# Patient Record
Sex: MALE | Race: OTHER | Employment: FULL TIME | URBAN - METROPOLITAN AREA
[De-identification: names, ages, dates, MRNs, and addresses within clinical notes are randomized per-mention and may not be internally consistent; named-entity substitution may affect disease eponyms.]

---

## 2018-03-28 ENCOUNTER — TELEPHONE (OUTPATIENT)
Dept: FAMILY MEDICINE CLINIC | Age: 48
End: 2018-03-28

## 2018-03-28 DIAGNOSIS — E11.9 TYPE 2 DIABETES MELLITUS WITHOUT COMPLICATION, WITHOUT LONG-TERM CURRENT USE OF INSULIN (HCC): ICD-10-CM

## 2018-03-30 ENCOUNTER — OFFICE VISIT (OUTPATIENT)
Dept: FAMILY MEDICINE CLINIC | Age: 48
End: 2018-03-30

## 2018-03-30 VITALS
OXYGEN SATURATION: 98 % | HEART RATE: 71 BPM | BODY MASS INDEX: 22.5 KG/M2 | TEMPERATURE: 97.1 F | DIASTOLIC BLOOD PRESSURE: 64 MMHG | RESPIRATION RATE: 16 BRPM | HEIGHT: 66 IN | SYSTOLIC BLOOD PRESSURE: 110 MMHG | WEIGHT: 140 LBS

## 2018-03-30 DIAGNOSIS — B35.3 TINEA PEDIS OF BOTH FEET: ICD-10-CM

## 2018-03-30 DIAGNOSIS — E11.9 TYPE 2 DIABETES MELLITUS WITHOUT COMPLICATION, WITHOUT LONG-TERM CURRENT USE OF INSULIN (HCC): Primary | ICD-10-CM

## 2018-03-30 DIAGNOSIS — E78.5 HYPERLIPIDEMIA, UNSPECIFIED HYPERLIPIDEMIA TYPE: ICD-10-CM

## 2018-03-30 LAB
A/G RATIO: 1.5 (ref 1.1–2.2)
ALBUMIN SERPL-MCNC: 4.2 G/DL (ref 3.4–5)
ALP BLD-CCNC: 112 U/L (ref 40–129)
ALT SERPL-CCNC: 17 U/L (ref 10–40)
ANION GAP SERPL CALCULATED.3IONS-SCNC: 14 MMOL/L (ref 3–16)
AST SERPL-CCNC: 11 U/L (ref 15–37)
BILIRUB SERPL-MCNC: 0.7 MG/DL (ref 0–1)
BUN BLDV-MCNC: 14 MG/DL (ref 7–20)
CALCIUM SERPL-MCNC: 8.9 MG/DL (ref 8.3–10.6)
CHLORIDE BLD-SCNC: 100 MMOL/L (ref 99–110)
CHOLESTEROL, TOTAL: 216 MG/DL (ref 0–199)
CO2: 26 MMOL/L (ref 21–32)
CREAT SERPL-MCNC: 0.6 MG/DL (ref 0.9–1.3)
CREATININE URINE: 146.4 MG/DL (ref 39–259)
GFR AFRICAN AMERICAN: >60
GFR NON-AFRICAN AMERICAN: >60
GLOBULIN: 2.8 G/DL
GLUCOSE BLD-MCNC: 212 MG/DL (ref 70–99)
HDLC SERPL-MCNC: 44 MG/DL (ref 40–60)
LDL CHOLESTEROL CALCULATED: 123 MG/DL
MICROALBUMIN UR-MCNC: 56.5 MG/DL
MICROALBUMIN/CREAT UR-RTO: 385.9 MG/G (ref 0–30)
POTASSIUM SERPL-SCNC: 4.3 MMOL/L (ref 3.5–5.1)
SODIUM BLD-SCNC: 140 MMOL/L (ref 136–145)
TOTAL PROTEIN: 7 G/DL (ref 6.4–8.2)
TRIGL SERPL-MCNC: 247 MG/DL (ref 0–150)
VLDLC SERPL CALC-MCNC: 49 MG/DL

## 2018-03-30 PROCEDURE — 99214 OFFICE O/P EST MOD 30 MIN: CPT | Performed by: FAMILY MEDICINE

## 2018-03-30 RX ORDER — BLOOD-GLUCOSE METER
KIT MISCELLANEOUS
Qty: 1 KIT | Refills: 0 | Status: SHIPPED | OUTPATIENT
Start: 2018-03-30

## 2018-03-30 RX ORDER — LANCETS 30 GAUGE
EACH MISCELLANEOUS
Qty: 100 EACH | Refills: 3 | Status: SHIPPED | OUTPATIENT
Start: 2018-03-30 | End: 2018-05-04 | Stop reason: SDUPTHER

## 2018-03-30 ASSESSMENT — ENCOUNTER SYMPTOMS
BLURRED VISION: 0
VISUAL CHANGE: 0

## 2018-03-30 NOTE — PROGRESS NOTES
Subjective:      Patient ID: Zonia Vogel is a 49 y.o. male. Diabetes   He presents for his follow-up diabetic visit. He has type 2 diabetes mellitus. His disease course has been stable. There are no hypoglycemic associated symptoms. Pertinent negatives for hypoglycemia include no confusion, dizziness, headaches, hunger, mood changes, nervousness/anxiousness, pallor, seizures, sleepiness, speech difficulty, sweats or tremors. Associated symptoms include weight loss. Pertinent negatives for diabetes include no blurred vision, no chest pain, no fatigue, no foot paresthesias, no foot ulcerations, no polydipsia, no polyphagia, no polyuria, no visual change and no weakness. There are no hypoglycemic complications. Pertinent negatives for hypoglycemia complications include no blackouts, no hospitalization, no nocturnal hypoglycemia, no required assistance and no required glucagon injection. Symptoms are stable. Pertinent negatives for diabetic complications include no CVA. Risk factors for coronary artery disease include diabetes mellitus and hypertension. Review of Systems   Constitutional: Positive for weight loss. Negative for fatigue. Eyes: Negative for blurred vision. Cardiovascular: Negative for chest pain. Endocrine: Negative for polydipsia, polyphagia and polyuria. Skin: Negative for pallor. Neurological: Negative for dizziness, tremors, seizures, speech difficulty, weakness and headaches. Psychiatric/Behavioral: Negative for confusion. The patient is not nervous/anxious. has No Known Allergies.       Current Outpatient Prescriptions:     glipiZIDE (GLUCOTROL) 10 MG tablet, Take 1 tablet by mouth 2 times daily, Disp: 60 tablet, Rfl: 1    metFORMIN (GLUCOPHAGE) 1000 MG tablet, Take 1 tablet by mouth daily (with breakfast), Disp: 90 tablet, Rfl: 3    metFORMIN (GLUCOPHAGE) 1000 MG tablet, Take 1 tablet by mouth 2 times daily (with meals), Disp: 60 tablet, Rfl: 2    omeprazole (PRILOSEC) 40 MG capsule, Take one po qam, Disp: 30 capsule, Rfl: 1    aspirin 325 MG EC tablet, Take 1 tablet by mouth daily. , Disp: 30 tablet, Rfl: 3    glucose blood VI test strips (ZOHRA CONTOUR TEST) strip, 1 each by In Vitro route daily. Checks BS bid Dx 250.00, Disp: 100 each, Rfl: 3    Lancets MISC, Contour Checks BID, Disp: 100 each, Rfl: 3    fluocinolone (SYNALAR) 0.025 % ointment, Apply  topically 2 times daily. Apply topically 2 times daily. , Disp: 15 g, Rfl: 1    terbinafine (LAMISIL AT) 1 % cream, Apply topically 2 times daily on toes, Disp: 15 g, Rfl: 2    HYDROcodone-acetaminophen (NORCO) 5-325 MG per tablet, Take 1 tablet by mouth every 4 hours as needed for Pain for 20 doses. Sedation precautions, Disp: 20 tablet, Rfl: 0    ibuprofen (IBU) 600 MG tablet, Take 1 tablet by mouth every 6 hours as needed for Pain for 20 doses. Take with food, Disp: 20 tablet, Rfl: 0     has a past medical history of Atypical chest pain; Diabetes mellitus ([address] GERD (gastroesophageal reflux disease); and Pleurisy. Past Surgical History:   Procedure Laterality Date    HAND SURGERY      both, 2010         reports that he quit smoking about 13 years ago. He has never used smokeless tobacco.    family history is not on file. Objective:   Physical Exam   Constitutional: He is oriented to person, place, and time. He appears well-developed and well-nourished. No distress. HENT:   Head: Normocephalic. Right Ear: External ear normal.   Left Ear: External ear normal.   Mouth/Throat: Oropharynx is clear and moist. No oropharyngeal exudate. Eyes: Conjunctivae and EOM are normal. Pupils are equal, round, and reactive to light. No scleral icterus. Neck: Normal range of motion. Neck supple. No JVD present. No thyromegaly present. Cardiovascular: Normal rate, regular rhythm, normal heart sounds and intact distal pulses. Exam reveals no gallop and no friction rub. No murmur heard.   Pulmonary/Chest: Effort normal and breath sounds normal. No respiratory distress. He has no wheezes. He has no rales. He exhibits no tenderness. Abdominal: Soft. Bowel sounds are normal. He exhibits no distension. There is no tenderness. There is no rebound. Musculoskeletal: Normal range of motion. He exhibits no edema. Neurological: He is alert and oriented to person, place, and time. He has normal reflexes. No cranial nerve deficit. He exhibits normal muscle tone. Coordination normal.   Skin: Skin is warm. He is not diaphoretic. No erythema. No pallor. Feet: skin dry, moist,erythematous fissure between toes  Filament test : intact  Ankle reflexes: wnl      Psychiatric: He has a normal mood and affect. His behavior is normal. Judgment and thought content normal.   Nursing note and vitals reviewed. Assessment:      1. Type 2 diabetes mellitus without complication, without long-term current use of insulin (HCC)  metFORMIN (GLUCOPHAGE) 1000 MG tablet    Lancets MISC    glucose blood VI test strips (ZOHRA CONTOUR TEST) strip    Comprehensive Metabolic Panel    Hemoglobin A1C    Lipid Panel    Microalbumin / Creatinine Urine Ratio    glucose monitoring kit (FREESTYLE) monitoring kit   2. Hyperlipidemia, unspecified hyperlipidemia type     3. Tinea pedis of both feet             Plan:      1. Poor follow-up I have not seen this patient since 2016. Check blood work  Refill medications that might need to adjust medications  patient agrees and verbalizes understanding    2. Check lipid panel     Feet care, Lamisil    Follow-up in one month    Kenna Stevenson received counseling on the following healthy behaviors: nutrition, exercise and medication adherence    Patient given educational materials on Diabetes, Nutrition and Exercise    I have instructed Kenna Stevenson to complete a self tracking handout on Blood Sugars  and instructed them to bring it with them to his next appointment.      Discussed use, benefit, and side effects of prescribed

## 2018-03-31 LAB
ESTIMATED AVERAGE GLUCOSE: 254.7 MG/DL
HBA1C MFR BLD: 10.5 %

## 2018-04-06 ENCOUNTER — OFFICE VISIT (OUTPATIENT)
Dept: FAMILY MEDICINE CLINIC | Age: 48
End: 2018-04-06

## 2018-04-06 VITALS
RESPIRATION RATE: 16 BRPM | HEIGHT: 66 IN | WEIGHT: 144.6 LBS | OXYGEN SATURATION: 98 % | DIASTOLIC BLOOD PRESSURE: 64 MMHG | HEART RATE: 81 BPM | BODY MASS INDEX: 23.24 KG/M2 | SYSTOLIC BLOOD PRESSURE: 120 MMHG | TEMPERATURE: 97 F

## 2018-04-06 DIAGNOSIS — E78.5 HYPERLIPIDEMIA, UNSPECIFIED HYPERLIPIDEMIA TYPE: ICD-10-CM

## 2018-04-06 PROCEDURE — 99214 OFFICE O/P EST MOD 30 MIN: CPT | Performed by: FAMILY MEDICINE

## 2018-04-06 RX ORDER — ATORVASTATIN CALCIUM 40 MG/1
40 TABLET, FILM COATED ORAL DAILY
Qty: 30 TABLET | Refills: 3 | Status: SHIPPED | OUTPATIENT
Start: 2018-04-06 | End: 2018-09-12 | Stop reason: SDUPTHER

## 2018-04-06 ASSESSMENT — PATIENT HEALTH QUESTIONNAIRE - PHQ9
1. LITTLE INTEREST OR PLEASURE IN DOING THINGS: 0
SUM OF ALL RESPONSES TO PHQ QUESTIONS 1-9: 0
2. FEELING DOWN, DEPRESSED OR HOPELESS: 0
SUM OF ALL RESPONSES TO PHQ9 QUESTIONS 1 & 2: 0

## 2018-04-06 ASSESSMENT — ENCOUNTER SYMPTOMS
VISUAL CHANGE: 0
BLURRED VISION: 0

## 2018-05-04 ENCOUNTER — OFFICE VISIT (OUTPATIENT)
Dept: FAMILY MEDICINE CLINIC | Age: 48
End: 2018-05-04

## 2018-05-04 VITALS
HEIGHT: 67 IN | RESPIRATION RATE: 16 BRPM | OXYGEN SATURATION: 98 % | BODY MASS INDEX: 22.26 KG/M2 | HEART RATE: 87 BPM | SYSTOLIC BLOOD PRESSURE: 114 MMHG | DIASTOLIC BLOOD PRESSURE: 64 MMHG | WEIGHT: 141.8 LBS | TEMPERATURE: 97.3 F

## 2018-05-04 DIAGNOSIS — B35.3 TINEA PEDIS OF RIGHT FOOT: ICD-10-CM

## 2018-05-04 DIAGNOSIS — M54.50 LEFT-SIDED LOW BACK PAIN WITHOUT SCIATICA, UNSPECIFIED CHRONICITY: ICD-10-CM

## 2018-05-04 DIAGNOSIS — E11.9 TYPE 2 DIABETES MELLITUS WITHOUT COMPLICATION, WITHOUT LONG-TERM CURRENT USE OF INSULIN (HCC): ICD-10-CM

## 2018-05-04 DIAGNOSIS — R07.9 EXERTIONAL CHEST PAIN: ICD-10-CM

## 2018-05-04 PROCEDURE — 1036F TOBACCO NON-USER: CPT | Performed by: FAMILY MEDICINE

## 2018-05-04 PROCEDURE — 99214 OFFICE O/P EST MOD 30 MIN: CPT | Performed by: FAMILY MEDICINE

## 2018-05-04 PROCEDURE — 2022F DILAT RTA XM EVC RTNOPTHY: CPT | Performed by: FAMILY MEDICINE

## 2018-05-04 PROCEDURE — G8420 CALC BMI NORM PARAMETERS: HCPCS | Performed by: FAMILY MEDICINE

## 2018-05-04 PROCEDURE — G8427 DOCREV CUR MEDS BY ELIG CLIN: HCPCS | Performed by: FAMILY MEDICINE

## 2018-05-04 PROCEDURE — 3046F HEMOGLOBIN A1C LEVEL >9.0%: CPT | Performed by: FAMILY MEDICINE

## 2018-05-04 RX ORDER — IBUPROFEN 600 MG/1
600 TABLET ORAL EVERY 6 HOURS PRN
Qty: 20 TABLET | Refills: 0 | Status: SHIPPED | OUTPATIENT
Start: 2018-05-04

## 2018-05-04 RX ORDER — HYDROCODONE BITARTRATE AND ACETAMINOPHEN 5; 325 MG/1; MG/1
1 TABLET ORAL EVERY 4 HOURS PRN
Qty: 20 TABLET | Refills: 0 | Status: CANCELLED | OUTPATIENT
Start: 2018-05-04

## 2018-05-04 RX ORDER — LANCETS 30 GAUGE
EACH MISCELLANEOUS
Qty: 100 EACH | Refills: 3 | Status: SHIPPED | OUTPATIENT
Start: 2018-05-04

## 2018-05-04 ASSESSMENT — ENCOUNTER SYMPTOMS
BACK PAIN: 1
SHORTNESS OF BREATH: 1
BLURRED VISION: 0
VISUAL CHANGE: 0
CHEST TIGHTNESS: 1

## 2018-05-05 LAB
ANION GAP SERPL CALCULATED.3IONS-SCNC: 12 MMOL/L (ref 3–16)
BUN BLDV-MCNC: 18 MG/DL (ref 7–20)
CALCIUM SERPL-MCNC: 9.4 MG/DL (ref 8.3–10.6)
CHLORIDE BLD-SCNC: 98 MMOL/L (ref 99–110)
CO2: 28 MMOL/L (ref 21–32)
CREAT SERPL-MCNC: 0.6 MG/DL (ref 0.9–1.3)
ESTIMATED AVERAGE GLUCOSE: 254.7 MG/DL
GFR AFRICAN AMERICAN: >60
GFR NON-AFRICAN AMERICAN: >60
GLUCOSE BLD-MCNC: 143 MG/DL (ref 70–99)
HBA1C MFR BLD: 10.5 %
POTASSIUM SERPL-SCNC: 4.3 MMOL/L (ref 3.5–5.1)
SODIUM BLD-SCNC: 138 MMOL/L (ref 136–145)

## 2018-05-18 ENCOUNTER — TELEPHONE (OUTPATIENT)
Dept: FAMILY MEDICINE CLINIC | Age: 48
End: 2018-05-18

## 2018-05-18 RX ORDER — SYRINGE-NEEDLE,INSULIN,0.5 ML 27GX1/2"
SYRINGE, EMPTY DISPOSABLE MISCELLANEOUS
Qty: 90 EACH | Refills: 1 | Status: SHIPPED | OUTPATIENT
Start: 2018-05-18 | End: 2018-05-18 | Stop reason: ALTCHOICE

## 2018-08-03 ENCOUNTER — OFFICE VISIT (OUTPATIENT)
Dept: FAMILY MEDICINE CLINIC | Age: 48
End: 2018-08-03

## 2018-08-03 VITALS
RESPIRATION RATE: 16 BRPM | BODY MASS INDEX: 22.13 KG/M2 | HEIGHT: 67 IN | HEART RATE: 74 BPM | DIASTOLIC BLOOD PRESSURE: 64 MMHG | WEIGHT: 141 LBS | SYSTOLIC BLOOD PRESSURE: 104 MMHG | TEMPERATURE: 97 F | OXYGEN SATURATION: 98 %

## 2018-08-03 DIAGNOSIS — S16.1XXA STRAIN OF NECK MUSCLE, INITIAL ENCOUNTER: Primary | ICD-10-CM

## 2018-08-03 DIAGNOSIS — R50.9 FEVER, UNSPECIFIED FEVER CAUSE: ICD-10-CM

## 2018-08-03 DIAGNOSIS — E11.9 TYPE 2 DIABETES MELLITUS WITHOUT COMPLICATION, WITHOUT LONG-TERM CURRENT USE OF INSULIN (HCC): ICD-10-CM

## 2018-08-03 DIAGNOSIS — S06.0X0A CONCUSSION WITHOUT LOSS OF CONSCIOUSNESS, INITIAL ENCOUNTER: ICD-10-CM

## 2018-08-03 PROCEDURE — 1036F TOBACCO NON-USER: CPT | Performed by: FAMILY MEDICINE

## 2018-08-03 PROCEDURE — G8420 CALC BMI NORM PARAMETERS: HCPCS | Performed by: FAMILY MEDICINE

## 2018-08-03 PROCEDURE — G8427 DOCREV CUR MEDS BY ELIG CLIN: HCPCS | Performed by: FAMILY MEDICINE

## 2018-08-03 PROCEDURE — 99214 OFFICE O/P EST MOD 30 MIN: CPT | Performed by: FAMILY MEDICINE

## 2018-08-03 PROCEDURE — 2022F DILAT RTA XM EVC RTNOPTHY: CPT | Performed by: FAMILY MEDICINE

## 2018-08-03 PROCEDURE — 3046F HEMOGLOBIN A1C LEVEL >9.0%: CPT | Performed by: FAMILY MEDICINE

## 2018-08-03 RX ORDER — CYCLOBENZAPRINE HCL 5 MG
5 TABLET ORAL 2 TIMES DAILY PRN
Qty: 20 TABLET | Refills: 0 | Status: SHIPPED | OUTPATIENT
Start: 2018-08-03 | End: 2018-08-23

## 2018-08-03 ASSESSMENT — ENCOUNTER SYMPTOMS
ABDOMINAL PAIN: 0
VOMITING: 0
SORE THROAT: 1
VISUAL CHANGE: 0
NAUSEA: 0
BLURRED VISION: 0

## 2018-08-03 NOTE — PROGRESS NOTES
Disp: 1 Tube, Rfl: 1    glipiZIDE (GLUCOTROL) 10 MG tablet, Take 1 tablet by mouth 2 times daily, Disp: 60 tablet, Rfl: 1    metFORMIN (GLUCOPHAGE) 1000 MG tablet, Take 1 tablet by mouth daily (with breakfast), Disp: 90 tablet, Rfl: 3    omeprazole (PRILOSEC) 40 MG capsule, Take one po qam, Disp: 30 capsule, Rfl: 1    fluocinolone (SYNALAR) 0.025 % ointment, Apply  topically 2 times daily. Apply topically 2 times daily. , Disp: 15 g, Rfl: 1    terbinafine (LAMISIL AT) 1 % cream, Apply topically 2 times daily on toes, Disp: 15 g, Rfl: 2    HYDROcodone-acetaminophen (NORCO) 5-325 MG per tablet, Take 1 tablet by mouth every 4 hours as needed for Pain for 20 doses. Sedation precautions, Disp: 20 tablet, Rfl: 0    aspirin 325 MG EC tablet, Take 1 tablet by mouth daily. , Disp: 30 tablet, Rfl: 3     has a past medical history of Atypical chest pain; Diabetes mellitus ([address] GERD (gastroesophageal reflux disease); and Pleurisy. Past Surgical History:   Procedure Laterality Date    HAND SURGERY      both, 2010         reports that he quit smoking about 13 years ago. He has never used smokeless tobacco.    family history is not sig     Objective:   Physical Exam   Constitutional: He is oriented to person, place, and time. He appears well-developed and well-nourished. No distress. HENT:   Head: Normocephalic. Mouth/Throat: Oropharynx is clear and moist. No oropharyngeal exudate. Mild OP erythema  Bilateral tm's wnl     Eyes: Conjunctivae and EOM are normal. Pupils are equal, round, and reactive to light. No scleral icterus. Neck: Normal range of motion. Neck supple. No thyromegaly present. No carotid bruits     Cardiovascular: Normal rate, regular rhythm, S2 normal, normal heart sounds and intact distal pulses. No extrasystoles are present. Exam reveals no friction rub. No murmur heard. No edema     Pulmonary/Chest: Effort normal and breath sounds normal. No respiratory distress. He has no wheezes.  He this medication as it  can cause drowsiness,  patient agrees and verbalizes understanding   Continue ibu  He needs to 's comp   patient agrees and verbalizes understanding    Patient to call if symptoms persist or worsen. 2. Rest, increase fluids  ibu prn   Fu 1 mo    3. NOS  Possible viral illness  Sx tx    4.  Pt did not filled insulin and just takes metformin  Check a1c   Encourage compliance with medication, life style changes

## 2018-08-04 LAB
ESTIMATED AVERAGE GLUCOSE: 248.9 MG/DL
HBA1C MFR BLD: 10.3 %

## 2018-08-10 ENCOUNTER — OFFICE VISIT (OUTPATIENT)
Dept: FAMILY MEDICINE CLINIC | Age: 48
End: 2018-08-10

## 2018-08-10 VITALS
OXYGEN SATURATION: 98 % | HEIGHT: 67 IN | RESPIRATION RATE: 16 BRPM | SYSTOLIC BLOOD PRESSURE: 118 MMHG | WEIGHT: 141 LBS | DIASTOLIC BLOOD PRESSURE: 64 MMHG | BODY MASS INDEX: 22.13 KG/M2 | HEART RATE: 71 BPM | TEMPERATURE: 97.4 F

## 2018-08-10 DIAGNOSIS — K05.10 GINGIVITIS: ICD-10-CM

## 2018-08-10 PROCEDURE — G8420 CALC BMI NORM PARAMETERS: HCPCS | Performed by: FAMILY MEDICINE

## 2018-08-10 PROCEDURE — 2022F DILAT RTA XM EVC RTNOPTHY: CPT | Performed by: FAMILY MEDICINE

## 2018-08-10 PROCEDURE — 1036F TOBACCO NON-USER: CPT | Performed by: FAMILY MEDICINE

## 2018-08-10 PROCEDURE — G8427 DOCREV CUR MEDS BY ELIG CLIN: HCPCS | Performed by: FAMILY MEDICINE

## 2018-08-10 PROCEDURE — 99214 OFFICE O/P EST MOD 30 MIN: CPT | Performed by: FAMILY MEDICINE

## 2018-08-10 PROCEDURE — 3046F HEMOGLOBIN A1C LEVEL >9.0%: CPT | Performed by: FAMILY MEDICINE

## 2018-08-10 RX ORDER — AMOXICILLIN 875 MG/1
875 TABLET, COATED ORAL 2 TIMES DAILY
Qty: 20 TABLET | Refills: 0 | Status: SHIPPED | OUTPATIENT
Start: 2018-08-10 | End: 2018-08-20

## 2018-08-13 ENCOUNTER — TELEPHONE (OUTPATIENT)
Dept: FAMILY MEDICINE CLINIC | Age: 48
End: 2018-08-13

## 2018-09-12 ENCOUNTER — OFFICE VISIT (OUTPATIENT)
Dept: FAMILY MEDICINE CLINIC | Age: 48
End: 2018-09-12

## 2018-09-12 VITALS
BODY MASS INDEX: 24.8 KG/M2 | SYSTOLIC BLOOD PRESSURE: 116 MMHG | TEMPERATURE: 97.6 F | HEIGHT: 63 IN | RESPIRATION RATE: 16 BRPM | WEIGHT: 140 LBS | OXYGEN SATURATION: 98 % | HEART RATE: 83 BPM | DIASTOLIC BLOOD PRESSURE: 70 MMHG

## 2018-09-12 DIAGNOSIS — E11.9 TYPE 2 DIABETES MELLITUS WITHOUT COMPLICATION, WITH LONG-TERM CURRENT USE OF INSULIN (HCC): Primary | ICD-10-CM

## 2018-09-12 DIAGNOSIS — E78.5 HYPERLIPIDEMIA, UNSPECIFIED HYPERLIPIDEMIA TYPE: ICD-10-CM

## 2018-09-12 DIAGNOSIS — Z23 NEED FOR VACCINATION: ICD-10-CM

## 2018-09-12 DIAGNOSIS — Z79.4 TYPE 2 DIABETES MELLITUS WITHOUT COMPLICATION, WITH LONG-TERM CURRENT USE OF INSULIN (HCC): Primary | ICD-10-CM

## 2018-09-12 DIAGNOSIS — M25.551 HIP PAIN, RIGHT: ICD-10-CM

## 2018-09-12 PROCEDURE — 99214 OFFICE O/P EST MOD 30 MIN: CPT | Performed by: FAMILY MEDICINE

## 2018-09-12 PROCEDURE — 90686 IIV4 VACC NO PRSV 0.5 ML IM: CPT | Performed by: FAMILY MEDICINE

## 2018-09-12 PROCEDURE — 90471 IMMUNIZATION ADMIN: CPT | Performed by: FAMILY MEDICINE

## 2018-09-12 RX ORDER — ATORVASTATIN CALCIUM 40 MG/1
40 TABLET, FILM COATED ORAL DAILY
Qty: 30 TABLET | Refills: 3 | Status: SHIPPED | OUTPATIENT
Start: 2018-09-12 | End: 2019-05-02 | Stop reason: SDUPTHER

## 2018-09-12 ASSESSMENT — ENCOUNTER SYMPTOMS
BLURRED VISION: 0
VISUAL CHANGE: 0

## 2018-09-12 NOTE — PROGRESS NOTES
nervous/anxious. has No Known Allergies. Current Outpatient Prescriptions:     insulin NPH (NOVOLIN N) 100 UNIT/ML injection vial, 6 units SQ bid, Disp: 1 vial, Rfl: 3    Insulin Pen Needle 29G X 12MM MISC, 1 each by Does not apply route daily, Disp: 100 each, Rfl: 3    metFORMIN (GLUCOPHAGE) 1000 MG tablet, Take 1 tablet by mouth daily (with breakfast), Disp: 90 tablet, Rfl: 3    Lancets MISC, Contour Checks BID, Disp: 100 each, Rfl: 3    Insulin Pen Needle 29G X 12MM MISC, 1 each by Does not apply route daily, Disp: 100 each, Rfl: 3    ibuprofen (IBU) 600 MG tablet, Take 1 tablet by mouth every 6 hours as needed for Pain Take with food, Disp: 20 tablet, Rfl: 0    atorvastatin (LIPITOR) 40 MG tablet, Take 1 tablet by mouth daily, Disp: 30 tablet, Rfl: 3    metFORMIN (GLUCOPHAGE) 1000 MG tablet, Take 1 tablet by mouth 2 times daily (with meals), Disp: 180 tablet, Rfl: 1    glucose blood VI test strips (ZOHRA CONTOUR TEST) strip, Checks before breakfast and 2 hours after dinner, Disp: 100 each, Rfl: 3    glucose monitoring kit (FREESTYLE) monitoring kit, Check sugar bid, Disp: 1 kit, Rfl: 0    terbinafine (ATHLETES FOOT) 1 % cream, Apply topically 2 times daily. , Disp: 1 Tube, Rfl: 1    glipiZIDE (GLUCOTROL) 10 MG tablet, Take 1 tablet by mouth 2 times daily, Disp: 60 tablet, Rfl: 1    omeprazole (PRILOSEC) 40 MG capsule, Take one po qam, Disp: 30 capsule, Rfl: 1    fluocinolone (SYNALAR) 0.025 % ointment, Apply  topically 2 times daily. Apply topically 2 times daily. , Disp: 15 g, Rfl: 1    terbinafine (LAMISIL AT) 1 % cream, Apply topically 2 times daily on toes, Disp: 15 g, Rfl: 2    HYDROcodone-acetaminophen (NORCO) 5-325 MG per tablet, Take 1 tablet by mouth every 4 hours as needed for Pain for 20 doses. Sedation precautions, Disp: 20 tablet, Rfl: 0    aspirin 325 MG EC tablet, Take 1 tablet by mouth daily. , Disp: 30 tablet, Rfl: 3     has a past medical history of Atypical chest 2. Hyperlipidemia, unspecified hyperlipidemia type  atorvastatin (LIPITOR) 40 MG tablet   3. Hip pain, right         Plan:      1. Seems to be improving  Continue same medications no changes needed at this time   Check labs in 2 months    2. Poor adherence  Sent new rx to his pharmacy  After discussion of the treatment of hyperlipidemia, a statin-drug is chosen; prescription for Lipitor (atorvastatin) once daily is written. The patient is informed that this type of drug is usually highly effective to lower LDL cholesterol and is usually very well tolerated. However, statin-type drugs can potentially injure the liver. Blood tests will be required at regular intervals for the duration of therapy. Damage to the liver, if detected early, can be reversed by stopping the drug. Be alert for persistent nausea, abdominal pain, or yellow jaundice, and report such to me directly. Statin drugs may also cause skeletal muscle injury in rare cases. Be alert for pronounced persistent diffuse muscle pain and discontinue the drug immediately should such symptoms develop. Grapefruit juice may increase the blood levels and side effects of some HMG Co-A reductase inhibitors (Zocor, Lipitor and Mevacor but not Pravachol or Lescol). Patient is counseled in this regard. flu vaccine today    3.  Hip pain  After a work accident, seems to be a chronic strain vs bursitis  nsaid prn   Continue PT and discuss with worker's comp MD  patient agrees and verbalizes understanding         Lanie Baron MD

## 2018-09-13 LAB
ANION GAP SERPL CALCULATED.3IONS-SCNC: 12 MMOL/L (ref 3–16)
BUN BLDV-MCNC: 15 MG/DL (ref 7–20)
CALCIUM SERPL-MCNC: 9.7 MG/DL (ref 8.3–10.6)
CHLORIDE BLD-SCNC: 101 MMOL/L (ref 99–110)
CO2: 29 MMOL/L (ref 21–32)
CREAT SERPL-MCNC: 0.7 MG/DL (ref 0.9–1.3)
GFR AFRICAN AMERICAN: >60
GFR NON-AFRICAN AMERICAN: >60
GLUCOSE BLD-MCNC: 147 MG/DL (ref 70–99)
POTASSIUM SERPL-SCNC: 4.3 MMOL/L (ref 3.5–5.1)
SODIUM BLD-SCNC: 142 MMOL/L (ref 136–145)

## 2019-02-21 DIAGNOSIS — E11.9 TYPE 2 DIABETES MELLITUS WITHOUT COMPLICATION, WITHOUT LONG-TERM CURRENT USE OF INSULIN (HCC): ICD-10-CM

## 2019-05-02 ENCOUNTER — OFFICE VISIT (OUTPATIENT)
Dept: FAMILY MEDICINE CLINIC | Age: 49
End: 2019-05-02
Payer: COMMERCIAL

## 2019-05-02 VITALS
HEART RATE: 68 BPM | SYSTOLIC BLOOD PRESSURE: 124 MMHG | HEIGHT: 67 IN | RESPIRATION RATE: 16 BRPM | OXYGEN SATURATION: 98 % | TEMPERATURE: 97 F | BODY MASS INDEX: 22.96 KG/M2 | WEIGHT: 146.3 LBS | DIASTOLIC BLOOD PRESSURE: 76 MMHG

## 2019-05-02 DIAGNOSIS — E78.5 HYPERLIPIDEMIA, UNSPECIFIED HYPERLIPIDEMIA TYPE: ICD-10-CM

## 2019-05-02 DIAGNOSIS — Z23 NEED FOR HEPATITIS B VACCINATION: ICD-10-CM

## 2019-05-02 DIAGNOSIS — E78.1 HYPERTRIGLYCERIDEMIA: ICD-10-CM

## 2019-05-02 DIAGNOSIS — B35.1 ONYCHOMYCOSIS: ICD-10-CM

## 2019-05-02 DIAGNOSIS — E11.9 TYPE 2 DIABETES MELLITUS WITHOUT COMPLICATION, WITHOUT LONG-TERM CURRENT USE OF INSULIN (HCC): Primary | ICD-10-CM

## 2019-05-02 DIAGNOSIS — Z11.4 ENCOUNTER FOR SCREENING FOR HIV: ICD-10-CM

## 2019-05-02 DIAGNOSIS — H53.8 BLURRY VISION: ICD-10-CM

## 2019-05-02 DIAGNOSIS — B35.3 TINEA PEDIS OF BOTH FEET: ICD-10-CM

## 2019-05-02 LAB
A/G RATIO: 1.5 (ref 1.1–2.2)
ALBUMIN SERPL-MCNC: 4.1 G/DL (ref 3.4–5)
ALP BLD-CCNC: 102 U/L (ref 40–129)
ALT SERPL-CCNC: 19 U/L (ref 10–40)
ANION GAP SERPL CALCULATED.3IONS-SCNC: 12 MMOL/L (ref 3–16)
AST SERPL-CCNC: 13 U/L (ref 15–37)
BILIRUB SERPL-MCNC: 0.7 MG/DL (ref 0–1)
BUN BLDV-MCNC: 11 MG/DL (ref 7–20)
CALCIUM SERPL-MCNC: 9.2 MG/DL (ref 8.3–10.6)
CHLORIDE BLD-SCNC: 101 MMOL/L (ref 99–110)
CHOLESTEROL, TOTAL: 226 MG/DL (ref 0–199)
CO2: 27 MMOL/L (ref 21–32)
CREAT SERPL-MCNC: 0.6 MG/DL (ref 0.9–1.3)
GFR AFRICAN AMERICAN: >60
GFR NON-AFRICAN AMERICAN: >60
GLOBULIN: 2.7 G/DL
GLUCOSE BLD-MCNC: 112 MG/DL (ref 70–99)
HDLC SERPL-MCNC: 46 MG/DL (ref 40–60)
LDL CHOLESTEROL CALCULATED: 151 MG/DL
POTASSIUM SERPL-SCNC: 3.9 MMOL/L (ref 3.5–5.1)
SODIUM BLD-SCNC: 140 MMOL/L (ref 136–145)
TOTAL PROTEIN: 6.8 G/DL (ref 6.4–8.2)
TRIGL SERPL-MCNC: 143 MG/DL (ref 0–150)
VLDLC SERPL CALC-MCNC: 29 MG/DL

## 2019-05-02 PROCEDURE — 90746 HEPB VACCINE 3 DOSE ADULT IM: CPT | Performed by: FAMILY MEDICINE

## 2019-05-02 PROCEDURE — 90471 IMMUNIZATION ADMIN: CPT | Performed by: FAMILY MEDICINE

## 2019-05-02 PROCEDURE — 99214 OFFICE O/P EST MOD 30 MIN: CPT | Performed by: FAMILY MEDICINE

## 2019-05-02 RX ORDER — ATORVASTATIN CALCIUM 40 MG/1
40 TABLET, FILM COATED ORAL DAILY
Qty: 90 TABLET | Refills: 1 | Status: SHIPPED | OUTPATIENT
Start: 2019-05-02 | End: 2021-06-14 | Stop reason: SDUPTHER

## 2019-05-02 ASSESSMENT — PATIENT HEALTH QUESTIONNAIRE - PHQ9
2. FEELING DOWN, DEPRESSED OR HOPELESS: 0
SUM OF ALL RESPONSES TO PHQ QUESTIONS 1-9: 0
SUM OF ALL RESPONSES TO PHQ9 QUESTIONS 1 & 2: 0
1. LITTLE INTEREST OR PLEASURE IN DOING THINGS: 0
SUM OF ALL RESPONSES TO PHQ QUESTIONS 1-9: 0

## 2019-05-02 ASSESSMENT — ENCOUNTER SYMPTOMS
BLURRED VISION: 0
VISUAL CHANGE: 0

## 2019-05-02 NOTE — PROGRESS NOTES
Subjective:      Patient ID: Evans Forte is a 47 y.o. male. Diabetes   He presents for his follow-up diabetic visit. He has type 2 diabetes mellitus. His disease course has been improving. Pertinent negatives for hypoglycemia include no confusion, dizziness, headaches, hunger, mood changes, nervousness/anxiousness, pallor, seizures, sleepiness, speech difficulty, sweats or tremors. Pertinent negatives for diabetes include no blurred vision, no chest pain, no fatigue, no foot paresthesias, no foot ulcerations, no polydipsia, no polyphagia, no polyuria, no visual change, no weakness and no weight loss. Symptoms are stable. Pertinent negatives for diabetic complications include no heart disease. Risk factors for coronary artery disease include dyslipidemia, diabetes mellitus, male sex and sedentary lifestyle. Current diabetic treatment includes insulin injections. His weight is stable. He is following a generally unhealthy diet. When asked about meal planning, he reported none. He has not had a previous visit with a dietitian. He never participates in exercise. His breakfast blood glucose is taken between 6-7 am. His breakfast blood glucose range is generally 130-140 mg/dl. An ACE inhibitor/angiotensin II receptor blocker is being taken. He does not see a podiatrist.Eye exam is not current. Hyperlipidemia  Did not took atorvastatin  \"pharmacy did not give it to me\"      Review of Systems   Constitutional: Negative for fatigue and weight loss. Eyes: Negative for blurred vision. Cardiovascular: Negative for chest pain. Endocrine: Negative for polydipsia, polyphagia and polyuria. Skin: Negative for pallor. Neurological: Negative for dizziness, tremors, seizures, speech difficulty, weakness and headaches. Psychiatric/Behavioral: Negative for confusion. The patient is not nervous/anxious. has No Known Allergies.       Current Outpatient Medications:     metFORMIN (GLUCOPHAGE) 1000 MG tablet, Take 1 tablet by mouth 2 times daily (with meals), Disp: 180 tablet, Rfl: 1    atorvastatin (LIPITOR) 40 MG tablet, Take 1 tablet by mouth daily, Disp: 90 tablet, Rfl: 1    metFORMIN (GLUCOPHAGE) 1000 MG tablet, TAKE ONE TABLET BY MOUTH TWICE DAILY WITH MEALS, Disp: 180 tablet, Rfl: 3    insulin NPH (NOVOLIN N) 100 UNIT/ML injection vial, 6 units SQ bid, Disp: 1 vial, Rfl: 3    Insulin Pen Needle 29G X 12MM MISC, 1 each by Does not apply route daily, Disp: 100 each, Rfl: 3    Lancets MISC, Contour Checks BID, Disp: 100 each, Rfl: 3    Insulin Pen Needle 29G X 12MM MISC, 1 each by Does not apply route daily, Disp: 100 each, Rfl: 3    ibuprofen (IBU) 600 MG tablet, Take 1 tablet by mouth every 6 hours as needed for Pain Take with food, Disp: 20 tablet, Rfl: 0    glucose blood VI test strips (ZOHRA CONTOUR TEST) strip, Checks before breakfast and 2 hours after dinner, Disp: 100 each, Rfl: 3    glucose monitoring kit (FREESTYLE) monitoring kit, Check sugar bid, Disp: 1 kit, Rfl: 0    terbinafine (ATHLETES FOOT) 1 % cream, Apply topically 2 times daily. , Disp: 1 Tube, Rfl: 1    glipiZIDE (GLUCOTROL) 10 MG tablet, Take 1 tablet by mouth 2 times daily, Disp: 60 tablet, Rfl: 1    omeprazole (PRILOSEC) 40 MG capsule, Take one po qam, Disp: 30 capsule, Rfl: 1    fluocinolone (SYNALAR) 0.025 % ointment, Apply  topically 2 times daily. Apply topically 2 times daily. , Disp: 15 g, Rfl: 1    terbinafine (LAMISIL AT) 1 % cream, Apply topically 2 times daily on toes, Disp: 15 g, Rfl: 2    HYDROcodone-acetaminophen (NORCO) 5-325 MG per tablet, Take 1 tablet by mouth every 4 hours as needed for Pain for 20 doses. Sedation precautions, Disp: 20 tablet, Rfl: 0    aspirin 325 MG EC tablet, Take 1 tablet by mouth daily. , Disp: 30 tablet, Rfl: 3     has a past medical history of Atypical chest pain, Diabetes mellitus ([address] GERD (gastroesophageal reflux disease), and Pleurisy.     Past Surgical History:   Procedure Laterality Date    HAND SURGERY      both, 2010         reports that he quit smoking about 14 years ago. He has never used smokeless tobacco.    family history is not sig      Objective:  Blood pressure 124/76, pulse 68, temperature 97 °F (36.1 °C), temperature source Tympanic, resp. rate 16, height 5' 7\" (1.702 m), weight 146 lb 4.8 oz (66.4 kg), SpO2 98 %. Physical Exam   Constitutional: He is oriented to person, place, and time. He appears well-developed and well-nourished. No distress. HENT:   Head: Normocephalic. Right Ear: External ear normal.   Left Ear: External ear normal.   Mouth/Throat: Oropharynx is clear and moist. No oropharyngeal exudate. Eyes: Pupils are equal, round, and reactive to light. Conjunctivae and EOM are normal. No scleral icterus. Neck: Normal range of motion. Neck supple. No JVD present. No thyromegaly present. Cardiovascular: Normal rate, regular rhythm, normal heart sounds and intact distal pulses. Exam reveals no gallop and no friction rub. No murmur heard. Pulses:       Dorsalis pedis pulses are 2+ on the right side, and 2+ on the left side. Posterior tibial pulses are 2+ on the right side, and 2+ on the left side. Pulmonary/Chest: Effort normal and breath sounds normal. No stridor. No respiratory distress. He has no wheezes. He has no rales. He exhibits no tenderness. Abdominal: Soft. Bowel sounds are normal. He exhibits no distension. There is no tenderness. There is no rebound. Musculoskeletal: Normal range of motion. He exhibits no edema. Right foot: There is normal range of motion and no deformity. Left foot: There is normal range of motion and no deformity. Feet:   Right Foot:   Protective Sensation: 5 sites tested. 5 sites sensed. Left Foot:   Protective Sensation: 5 sites tested. 5 sites sensed. Neurological: He is alert and oriented to person, place, and time. He has normal reflexes. No cranial nerve deficit.  He exhibits normal muscle tone. Coordination normal.   Skin: Skin is warm. Capillary refill takes less than 2 seconds. He is not diaphoretic. No erythema. No pallor. R interdigital macerated skin between 4-5 toes. Thick yellow nails     Psychiatric: He has a normal mood and affect. His behavior is normal. Judgment and thought content normal.   Nursing note and vitals reviewed. Assessment:       Diagnosis Orders   1. Type 2 diabetes mellitus without complication, without long-term current use of insulin ([facility])  metFORMIN (GLUCOPHAGE) 1000 MG tablet    Comprehensive Metabolic Panel    Hemoglobin A1C    Microalbumin / Creatinine Urine Ratio    GÉNESIS Farley MD, Ophthalmology, [facility]    Amb External Referral To Podiatry   2. Hyperlipidemia, unspecified hyperlipidemia type  atorvastatin (LIPITOR) 40 MG tablet   3. Christian Issa MD, Ophthalmology, [facility]   4. Hypertriglyceridemia  Lipid Panel   5. Encounter for screening for HIV  HIV Screen   6. Tinea pedis of both feet  Amb External Referral To Podiatry   7. Onychomycosis  Amb External Referral To Podiatry           Plan:      1. Uncontrolled base on previous labs  Check fu bw   Adjust med as needed, he is using Novolin only qd   Encourage compliance with medication, life style changes    2. Start lipitor as recommended in past    3. Referral to [facility]    4. Skin care, referral to podiatry    Gilmer Be received counseling on the following healthy behaviors: nutrition, exercise and medication adherence    Patient given educational materials on Diabetes, Hyperlipidemia, Nutrition and Exercise    I have instructed Gilmer Be to complete a self tracking handout on Blood Sugars  and Weights and instructed them to bring it with them to his next appointment. Discussed use, benefit, and side effects of prescribed medications. Barriers to medication compliance addressed. All patient questions answered.   Pt voiced understanding. '        shinfozia at pharmacy  Hep B today # 1   Thuy Longoria MD

## 2019-05-03 LAB
CREATININE URINE: 163.2 MG/DL (ref 39–259)
ESTIMATED AVERAGE GLUCOSE: 274.7 MG/DL
HBA1C MFR BLD: 11.2 %
MICROALBUMIN UR-MCNC: 63.4 MG/DL
MICROALBUMIN/CREAT UR-RTO: 388.5 MG/G (ref 0–30)

## 2019-05-04 LAB — MISCELLANEOUS LAB TEST ORDER: NORMAL

## 2019-06-07 ENCOUNTER — OFFICE VISIT (OUTPATIENT)
Dept: FAMILY MEDICINE CLINIC | Age: 49
End: 2019-06-07
Payer: COMMERCIAL

## 2019-06-07 VITALS
TEMPERATURE: 97.6 F | DIASTOLIC BLOOD PRESSURE: 70 MMHG | HEART RATE: 69 BPM | OXYGEN SATURATION: 98 % | HEIGHT: 65 IN | BODY MASS INDEX: 24.12 KG/M2 | WEIGHT: 144.8 LBS | RESPIRATION RATE: 16 BRPM | SYSTOLIC BLOOD PRESSURE: 120 MMHG

## 2019-06-07 DIAGNOSIS — G89.29 CHRONIC PAIN OF RIGHT HIP: ICD-10-CM

## 2019-06-07 DIAGNOSIS — E11.65 UNCONTROLLED TYPE 2 DIABETES MELLITUS WITH HYPERGLYCEMIA (HCC): Primary | ICD-10-CM

## 2019-06-07 DIAGNOSIS — K52.9 CHRONIC DIARRHEA: ICD-10-CM

## 2019-06-07 DIAGNOSIS — M25.551 CHRONIC PAIN OF RIGHT HIP: ICD-10-CM

## 2019-06-07 PROCEDURE — 99214 OFFICE O/P EST MOD 30 MIN: CPT | Performed by: FAMILY MEDICINE

## 2019-06-07 RX ORDER — BLOOD-GLUCOSE METER
1 KIT MISCELLANEOUS DAILY
Qty: 1 KIT | Refills: 0 | Status: SHIPPED | OUTPATIENT
Start: 2019-06-07

## 2019-06-07 ASSESSMENT — ENCOUNTER SYMPTOMS
DIARRHEA: 1
NAUSEA: 0
STRIDOR: 0
ABDOMINAL DISTENTION: 0
CONSTIPATION: 0
CHEST TIGHTNESS: 0

## 2019-06-07 NOTE — PROGRESS NOTES
Neurological: Negative for dizziness, tremors, seizures, speech difficulty and headaches. Psychiatric/Behavioral: Negative for confusion. The patient is not nervous/anxious. has No Known Allergies. Current Outpatient Medications:     insulin glargine (LANTUS) 100 UNIT/ML injection pen, Inject 5 Units into the skin nightly, Disp: 3 pen, Rfl: 0    metFORMIN (GLUCOPHAGE) 1000 MG tablet, Take 1 tablet by mouth 2 times daily (with meals) Dispense brand name, Disp: 60 tablet, Rfl: 3    glucose monitoring kit (FREESTYLE) monitoring kit, 1 kit by Does not apply route daily, Disp: 1 kit, Rfl: 0    blood glucose test strips (ASCENSIA AUTODISC VI;ONE TOUCH ULTRA TEST VI) strip, 1 each by In Vitro route daily As needed. , Disp: 100 each, Rfl: 3    metFORMIN (GLUCOPHAGE) 1000 MG tablet, Take 1 tablet by mouth 2 times daily (with meals), Disp: 180 tablet, Rfl: 1    atorvastatin (LIPITOR) 40 MG tablet, Take 1 tablet by mouth daily, Disp: 90 tablet, Rfl: 1    metFORMIN (GLUCOPHAGE) 1000 MG tablet, TAKE ONE TABLET BY MOUTH TWICE DAILY WITH MEALS, Disp: 180 tablet, Rfl: 3    insulin NPH (NOVOLIN N) 100 UNIT/ML injection vial, 6 units SQ bid, Disp: 1 vial, Rfl: 3    Insulin Pen Needle 29G X 12MM MISC, 1 each by Does not apply route daily, Disp: 100 each, Rfl: 3    Lancets MISC, Contour Checks BID, Disp: 100 each, Rfl: 3    Insulin Pen Needle 29G X 12MM MISC, 1 each by Does not apply route daily, Disp: 100 each, Rfl: 3    ibuprofen (IBU) 600 MG tablet, Take 1 tablet by mouth every 6 hours as needed for Pain Take with food, Disp: 20 tablet, Rfl: 0    glucose blood VI test strips (ZOHRA CONTOUR TEST) strip, Checks before breakfast and 2 hours after dinner, Disp: 100 each, Rfl: 3    glucose monitoring kit (FREESTYLE) monitoring kit, Check sugar bid, Disp: 1 kit, Rfl: 0    omeprazole (PRILOSEC) 40 MG capsule, Take one po qam, Disp: 30 capsule, Rfl: 1    aspirin 325 MG EC tablet, Take 1 tablet by mouth daily. , Disp: 30 tablet, Rfl: 3     has a past medical history of Atypical chest pain, Diabetes mellitus ([address] GERD (gastroesophageal reflux disease), and Pleurisy. Past Surgical History:   Procedure Laterality Date    HAND SURGERY      both, 2010         reports that he quit smoking about 14 years ago. He has never used smokeless tobacco.    family history is not sig    Objective:  Blood pressure 120/70, pulse 69, temperature 97.6 °F (36.4 °C), temperature source Tympanic, resp. rate 16, height 5' 5\" (1.651 m), weight 144 lb 12.8 oz (65.7 kg), SpO2 98 %. Physical Exam   Constitutional: He is oriented to person, place, and time. He appears well-developed and well-nourished. No distress. HENT:   Head: Normocephalic. Mouth/Throat: No oropharyngeal exudate. Eyes: Pupils are equal, round, and reactive to light. Conjunctivae and EOM are normal. No scleral icterus. Neck: Normal range of motion. Neck supple. Cardiovascular: Normal rate, normal heart sounds and intact distal pulses. Exam reveals no gallop and no friction rub. No murmur heard. Pulmonary/Chest: Effort normal and breath sounds normal. No respiratory distress. He has no wheezes. He has no rales. He exhibits no tenderness. Abdominal: Soft. Bowel sounds are normal. He exhibits no distension and no mass. There is no tenderness. There is no rebound and no guarding. Musculoskeletal: Normal range of motion. He exhibits no edema. Right hip: He exhibits decreased strength. He exhibits normal range of motion, no tenderness, no bony tenderness and no swelling. Left hip: He exhibits normal range of motion, normal strength, no tenderness and no bony tenderness. Lymphadenopathy:     He has no cervical adenopathy. Neurological: He is alert and oriented to person, place, and time. No cranial nerve deficit. He exhibits normal muscle tone. Coordination normal.   Skin: Skin is warm. Capillary refill takes less than 2 seconds. No rash noted. He is not diaphoretic. No erythema. Psychiatric: He has a normal mood and affect. His behavior is normal. Thought content normal.   Nursing note and vitals reviewed. Assessment:       Diagnosis Orders   1. Uncontrolled type 2 diabetes mellitus with hyperglycemia [facility])  Leander Yi MD, Endocrinology, [facility]    glucose monitoring kit (FREESTYLE) monitoring kit    blood glucose test strips (ASCENSIA AUTODISC VI;ONE TOUCH ULTRA TEST VI) strip   2. Chronic pain of right hip  Baltazar Mcghee MD, Orthopedic Surgery (Hip, Knee, Shoulder), [facility]   3. Chronic diarrhea             Plan:      1. Unchanged  Referral to endocrinology  Change generic metforming to glucophage sec to chronic diarrhea   Fu 1 mo  Add lantus  Ss/hypoglycemia discussed, tx discussed     2. Aleve bid   Referral    3.  See # 1             Scotty Saenz MD

## 2019-07-11 ENCOUNTER — OFFICE VISIT (OUTPATIENT)
Dept: ORTHOPEDIC SURGERY | Age: 49
End: 2019-07-11
Payer: COMMERCIAL

## 2019-07-11 VITALS
HEART RATE: 69 BPM | TEMPERATURE: 98.3 F | BODY MASS INDEX: 23.99 KG/M2 | SYSTOLIC BLOOD PRESSURE: 124 MMHG | HEIGHT: 65 IN | DIASTOLIC BLOOD PRESSURE: 77 MMHG | WEIGHT: 144 LBS

## 2019-07-11 DIAGNOSIS — M16.11 PRIMARY OSTEOARTHRITIS OF RIGHT HIP: ICD-10-CM

## 2019-07-11 DIAGNOSIS — M25.551 RIGHT HIP PAIN: Primary | ICD-10-CM

## 2019-07-11 PROCEDURE — 99203 OFFICE O/P NEW LOW 30 MIN: CPT | Performed by: PHYSICIAN ASSISTANT

## 2019-07-11 NOTE — PROGRESS NOTES
and a 2 view right hip this showed very minimal degenerative changes including subchondral sclerosis of the hip joint but no significant bony abnormalities were noted and this was shown to the patient      Assessment:  Right hip possible labrum tear versus hip flexor 30    Plan:  During today's visit, there was approximately 30 minutes of face-to-face discussion in regards to the patient's current condition and treatment options. More than 50 % of the time was counseling and coordination of care. At this point we discussed options and treatment and given the chronicity of his symptoms he is used anti-inflammatories given a time to heal but still has persistent I am going to schedule an ultrasound-guided hip injection for therapeutic and diagnostic reasons. This provocative test is to help rule out a labrum tear if he continues to have symptoms I would consider getting an MRI.   We will have him follow-up after the  injection    PROCEDURE NOTE:   Schedule right hip joint injection with under ultrasound      They will schedule a follow up in 2 to 3 weeks

## 2019-07-23 ENCOUNTER — OFFICE VISIT (OUTPATIENT)
Dept: ORTHOPEDIC SURGERY | Age: 49
End: 2019-07-23
Payer: COMMERCIAL

## 2019-07-23 VITALS
DIASTOLIC BLOOD PRESSURE: 69 MMHG | BODY MASS INDEX: 23.99 KG/M2 | SYSTOLIC BLOOD PRESSURE: 120 MMHG | WEIGHT: 144 LBS | HEART RATE: 64 BPM | HEIGHT: 65 IN

## 2019-07-23 DIAGNOSIS — M16.11 PRIMARY OSTEOARTHRITIS OF RIGHT HIP: Primary | ICD-10-CM

## 2019-07-23 PROCEDURE — 20611 DRAIN/INJ JOINT/BURSA W/US: CPT | Performed by: ORTHOPAEDIC SURGERY

## 2019-07-23 RX ORDER — METHYLPREDNISOLONE ACETATE 40 MG/ML
80 INJECTION, SUSPENSION INTRA-ARTICULAR; INTRALESIONAL; INTRAMUSCULAR; SOFT TISSUE ONCE
Status: COMPLETED | OUTPATIENT
Start: 2019-07-23 | End: 2019-07-23

## 2019-07-23 RX ADMIN — METHYLPREDNISOLONE ACETATE 80 MG: 40 INJECTION, SUSPENSION INTRA-ARTICULAR; INTRALESIONAL; INTRAMUSCULAR; SOFT TISSUE at 16:59

## 2019-07-23 NOTE — PROGRESS NOTES
ULTRASOUND GUIDED HIP INJECTION    Jonah     July 23, 2019    Chief Complaint   Patient presents with    Hip Pain     right hip ultrasound injection, sent by Alina Lovett       /69   Pulse 64   Ht 5' 5\" (1.651 m)   Wt 144 lb (65.3 kg)   BMI 23.96 kg/m²     Marii James 59-year-old gentleman from [facility] who is sent by Dr. Devon Li and ultrasound directed intra-articular steroid injection of his right hip. He did have a work-related injury in July 2018. He was treated initially with physical therapy for 3 months with good relief of his pain however his pain is now returned. He has pain up to 5 in his groin and lateral right hip which is exacerbated from prolonged sitting such as driving a car. It is associated with occasional catching. his right hip. Review of systems reviewed from patient history dated on  7/11/19  and available in the patient's chart under media tab. Physical Exam:   Examination of the righthip shows a positive logroll. No Lesion of the skin is noted over the injection site    Impression:  right hip osteoarthritis. Plan:  1. Injection with cortisone was recommended into  right   hip joint using ultrasound visualization. He understands the risks and benefits of the injection and describes no potential allergies. Time out was performed to verify correct person, correct procedure, and correct site. 2. Ultrasound visualization was first performed utilizing the [facility] Ultrasound unit using an 5/2 MHz Curved Probe. finding the femoral neck head junction. Next the femoral artery and vein were visualized with ultrasound medial to the femoral head. The location of the needle insertion was determined well lateral to the neurovascular structures and the site was anesthetized with 3 mL of 1% Lidocaine. The site was then prepped with ChloraPrep. A sterile cover was placed over the transducer head and the femoral head neck junction once again visualized.  A 20-gauge spinal

## 2019-09-05 ENCOUNTER — OFFICE VISIT (OUTPATIENT)
Dept: ENDOCRINOLOGY | Age: 49
End: 2019-09-05
Payer: COMMERCIAL

## 2019-09-05 VITALS
HEIGHT: 66 IN | WEIGHT: 144.6 LBS | SYSTOLIC BLOOD PRESSURE: 132 MMHG | OXYGEN SATURATION: 98 % | BODY MASS INDEX: 23.24 KG/M2 | HEART RATE: 68 BPM | DIASTOLIC BLOOD PRESSURE: 78 MMHG

## 2019-09-05 DIAGNOSIS — E11.9 TYPE 2 DIABETES MELLITUS WITHOUT COMPLICATION, WITH LONG-TERM CURRENT USE OF INSULIN (HCC): Primary | ICD-10-CM

## 2019-09-05 DIAGNOSIS — E78.2 MIXED HYPERLIPIDEMIA: ICD-10-CM

## 2019-09-05 DIAGNOSIS — Z79.4 TYPE 2 DIABETES MELLITUS WITHOUT COMPLICATION, WITH LONG-TERM CURRENT USE OF INSULIN (HCC): Primary | ICD-10-CM

## 2019-09-05 DIAGNOSIS — R80.9 ALBUMINURIA: ICD-10-CM

## 2019-09-05 LAB — HBA1C MFR BLD: 11.2 %

## 2019-09-05 PROCEDURE — 83036 HEMOGLOBIN GLYCOSYLATED A1C: CPT | Performed by: INTERNAL MEDICINE

## 2019-09-05 PROCEDURE — 99244 OFF/OP CNSLTJ NEW/EST MOD 40: CPT | Performed by: INTERNAL MEDICINE

## 2019-09-05 NOTE — PROGRESS NOTES
09/05/2019         Assessment/Plan      1. Type 2 DM     Trini Virk is a 47 y.o. male has Type 2 DM       Uncontrolled. A1c 11.2 % ---> 96.8 %       Complicated by retinopathy, albuminuria    Non-compliant with testing sugars, diet      -Continue metformin     -Will DC NPH    - Will start Tresiba 12 units QPM    -Strongly advised to stop drinking regular soda/juice. Advised follow-up with the ophthalmologist once year. Urine microalbumin/cr ratio high on multiple measures. Will start on lisinopril 2.5 mg daily. Discussed foot care. BP at goal    2. Hyperlipidemia. On statins.

## 2019-12-03 ENCOUNTER — OFFICE VISIT (OUTPATIENT)
Dept: ENDOCRINOLOGY | Age: 49
End: 2019-12-03
Payer: COMMERCIAL

## 2019-12-03 VITALS
DIASTOLIC BLOOD PRESSURE: 68 MMHG | OXYGEN SATURATION: 97 % | HEART RATE: 67 BPM | BODY MASS INDEX: 23.56 KG/M2 | HEIGHT: 66 IN | WEIGHT: 146.6 LBS | SYSTOLIC BLOOD PRESSURE: 125 MMHG

## 2019-12-03 DIAGNOSIS — E11.9 TYPE 2 DIABETES MELLITUS WITHOUT COMPLICATION, WITH LONG-TERM CURRENT USE OF INSULIN (HCC): Primary | ICD-10-CM

## 2019-12-03 DIAGNOSIS — E78.2 MIXED HYPERLIPIDEMIA: ICD-10-CM

## 2019-12-03 DIAGNOSIS — Z79.4 TYPE 2 DIABETES MELLITUS WITHOUT COMPLICATION, WITH LONG-TERM CURRENT USE OF INSULIN (HCC): Primary | ICD-10-CM

## 2019-12-03 LAB — HBA1C MFR BLD: 7.8 %

## 2019-12-03 PROCEDURE — 99214 OFFICE O/P EST MOD 30 MIN: CPT | Performed by: INTERNAL MEDICINE

## 2019-12-03 PROCEDURE — 83036 HEMOGLOBIN GLYCOSYLATED A1C: CPT | Performed by: INTERNAL MEDICINE

## 2019-12-03 ASSESSMENT — ENCOUNTER SYMPTOMS
PHOTOPHOBIA: 0
BACK PAIN: 0
COUGH: 0

## 2020-04-10 ENCOUNTER — VIRTUAL VISIT (OUTPATIENT)
Dept: ENDOCRINOLOGY | Age: 50
End: 2020-04-10

## 2020-04-10 PROCEDURE — 99213 OFFICE O/P EST LOW 20 MIN: CPT | Performed by: INTERNAL MEDICINE

## 2020-04-10 RX ORDER — INSULIN DEGLUDEC INJECTION 100 U/ML
10 INJECTION, SOLUTION SUBCUTANEOUS EVERY EVENING
Qty: 5 PEN | Refills: 2 | Status: SHIPPED | OUTPATIENT
Start: 2020-04-10 | End: 2021-02-12 | Stop reason: SDUPTHER

## 2020-04-10 ASSESSMENT — ENCOUNTER SYMPTOMS
BACK PAIN: 0
PHOTOPHOBIA: 0
COUGH: 0

## 2020-04-10 NOTE — PROGRESS NOTES
Endocrinology  Wen Ramirez M.D. Phone: [phone]   FAX: [phone]       Jose Aguero   YOB: 1970    Date of Visit:  4/10/2020    No Known Allergies  Outpatient Medications Marked as Taking for the 4/10/20 encounter (Virtual Visit) with Erick Caraballo MD   Medication Sig Dispense Refill    Insulin Degludec (TRESIBA FLEXTOUCH) 100 UNIT/ML SOPN Inject 10 Units into the skin every evening 5 pen 2    metFORMIN (GLUCOPHAGE) 1000 MG tablet Take 1 tablet by mouth daily (with breakfast) Dispense brand name 30 tablet 5    Insulin Pen Needle (B-D UF III MINI PEN NEEDLES) 31G X 5 MM MISC 1 each by Does not apply route daily 100 each 6    Insulin Syringe-Needle U-100 (RELION INSULIN SYRINGE) 31G X 15/64\" 0.5 ML MISC Use as directed 100 each 0    glucose monitoring kit (FREESTYLE) monitoring kit 1 kit by Does not apply route daily 1 kit 0    blood glucose test strips (ASCENSIA AUTODISC VI;ONE TOUCH ULTRA TEST VI) strip 1 each by In Vitro route daily As needed. 100 each 3    atorvastatin (LIPITOR) 40 MG tablet Take 1 tablet by mouth daily 90 tablet 1    Lancets MISC Contour Checks  each 3    ibuprofen (IBU) 600 MG tablet Take 1 tablet by mouth every 6 hours as needed for Pain Take with food 20 tablet 0    glucose blood VI test strips (ZOHRA CONTOUR TEST) strip Checks before breakfast and 2 hours after dinner 100 each 3    glucose monitoring kit (FREESTYLE) monitoring kit Check sugar bid 1 kit 0    omeprazole (PRILOSEC) 40 MG capsule Take one po qam 30 capsule 1         There were no vitals filed for this visit. There is no height or weight on file to calculate BMI.      Wt Readings from Last 3 Encounters:   12/03/19 146 lb 9.6 oz (66.5 kg)   09/05/19 144 lb 9.6 oz (65.6 kg)   07/23/19 144 lb (65.3 kg)     BP Readings from Last 3 Encounters:   12/03/19 125/68   09/05/19 132/78   07/23/19 120/69        Past Medical History:   Diagnosis Date    Atypical chest pain Feb 2015

## 2021-02-12 RX ORDER — INSULIN DEGLUDEC INJECTION 100 U/ML
10 INJECTION, SOLUTION SUBCUTANEOUS EVERY EVENING
Qty: 5 PEN | Refills: 2 | Status: SHIPPED | OUTPATIENT
Start: 2021-02-12 | End: 2021-06-14 | Stop reason: SDUPTHER

## 2021-02-12 NOTE — TELEPHONE ENCOUNTER
Medication:   Requested Prescriptions      No prescriptions requested or ordered in this encounter       Last Filled:      Patient Phone Number: [phone] (home) [phone] (work)    Last appt: 12/3/2019   Next appt: 3/31/2021    Last Labs DM:   Lab Results   Component Value Date    LABA1C 9.4 04/10/2020

## 2021-06-14 ENCOUNTER — OFFICE VISIT (OUTPATIENT)
Dept: ENDOCRINOLOGY | Age: 51
End: 2021-06-14
Payer: COMMERCIAL

## 2021-06-14 VITALS
BODY MASS INDEX: 24.4 KG/M2 | OXYGEN SATURATION: 97 % | HEIGHT: 66 IN | HEART RATE: 76 BPM | DIASTOLIC BLOOD PRESSURE: 81 MMHG | SYSTOLIC BLOOD PRESSURE: 135 MMHG | WEIGHT: 151.8 LBS

## 2021-06-14 DIAGNOSIS — E78.5 HYPERLIPIDEMIA, UNSPECIFIED HYPERLIPIDEMIA TYPE: ICD-10-CM

## 2021-06-14 DIAGNOSIS — N52.9 ERECTILE DYSFUNCTION, UNSPECIFIED ERECTILE DYSFUNCTION TYPE: ICD-10-CM

## 2021-06-14 DIAGNOSIS — E78.49 OTHER HYPERLIPIDEMIA: ICD-10-CM

## 2021-06-14 LAB — HBA1C MFR BLD: 12.6 %

## 2021-06-14 PROCEDURE — 99215 OFFICE O/P EST HI 40 MIN: CPT | Performed by: INTERNAL MEDICINE

## 2021-06-14 PROCEDURE — 83036 HEMOGLOBIN GLYCOSYLATED A1C: CPT | Performed by: INTERNAL MEDICINE

## 2021-06-14 RX ORDER — INSULIN DEGLUDEC INJECTION 100 U/ML
INJECTION, SOLUTION SUBCUTANEOUS
Qty: 5 PEN | Refills: 2 | Status: SHIPPED | OUTPATIENT
Start: 2021-06-14 | End: 2022-01-10

## 2021-06-14 RX ORDER — ATORVASTATIN CALCIUM 40 MG/1
40 TABLET, FILM COATED ORAL DAILY
Qty: 90 TABLET | Refills: 1 | Status: SHIPPED | OUTPATIENT
Start: 2021-06-14 | End: 2022-04-27 | Stop reason: SDUPTHER

## 2021-06-14 RX ORDER — GABAPENTIN 300 MG/1
CAPSULE ORAL
Qty: 60 CAPSULE | Refills: 2 | Status: SHIPPED | OUTPATIENT
Start: 2021-06-14 | End: 2022-04-27 | Stop reason: SDUPTHER

## 2021-06-14 RX ORDER — DULAGLUTIDE 0.75 MG/.5ML
0.75 INJECTION, SOLUTION SUBCUTANEOUS WEEKLY
Qty: 4 PEN | Refills: 2 | Status: SHIPPED | OUTPATIENT
Start: 2021-06-14 | End: 2021-09-21

## 2021-06-14 NOTE — PROGRESS NOTES
Patient has a PMH of Type 2 DM, hypertension, hyperlipidemia,    Seen as new patient    Has seen Dr. Shante Carrillo    Seen after a year    Still drinking regular soda    Diagnosed with Diabetes Mellitus type 2 > 10 yrs,  Course has been variable . Microvascular complications: Has retinopathy (Last eye exam: 07/19)   Has Nephropathy : albuminuria    Has Peripheral neuropathy. Home regimen:  Metformin 1000 mg Qdaily side effect with higher dose  Tresiba 12-14  units QHS      Previous medications:  Novolin N     Blood glucose trend  100s    Hypoglycemia: No    A1c 11.2 % ---> 11.2 % --->7.8 %---> 9.4%      Diet: Eats 3  Meals/day. He stopped Drinks regular soda/juice all day. Nutrition education: No  Exercise: No    M/C 4/21 :   341    Moderate, uncontrolled    Hyperlipidemia:  He has mixed hyperlipidemia  On atorvastatin 40 mg daily.  Not taking   on 4/21    Reports ED, not on medication    C/o tingling in outer thigh    Past Medical History:   Diagnosis Date    Atypical chest pain Feb 2015    GXT neg     Diabetes mellitus ([address]     GERD (gastroesophageal reflux disease)     Pleurisy Sep 2013     Past Surgical History:   Procedure Laterality Date    HAND SURGERY      both, 2010      Current Outpatient Medications   Medication Sig Dispense Refill    Insulin Degludec (TRESIBA FLEXTOUCH) 100 UNIT/ML SOPN Inject 10 Units into the skin every evening 5 pen 2    metFORMIN (GLUCOPHAGE) 1000 MG tablet Take 1 tablet by mouth daily (with breakfast) Dispense brand name 30 tablet 5    Insulin Pen Needle (B-D UF III MINI PEN NEEDLES) 31G X 5 MM MISC 1 each by Does not apply route daily 100 each 6    Insulin Syringe-Needle U-100 (RELION INSULIN SYRINGE) 31G X 15/64\" 0.5 ML MISC Use as directed 100 each 0    glucose monitoring kit (FREESTYLE) monitoring kit 1 kit by Does not apply route daily 1 kit 0    blood glucose test strips (ASCENSIA AUTODISC VI;ONE TOUCH ULTRA TEST VI) strip 1 each by In Vitro route

## 2021-06-15 ENCOUNTER — TELEPHONE (OUTPATIENT)
Dept: ENDOCRINOLOGY | Age: 51
End: 2021-06-15

## 2021-06-15 LAB
A/G RATIO: 1.4 (ref 1.1–2.2)
ALBUMIN SERPL-MCNC: 4 G/DL (ref 3.4–5)
ALP BLD-CCNC: 135 U/L (ref 40–129)
ALT SERPL-CCNC: 32 U/L (ref 10–40)
ANION GAP SERPL CALCULATED.3IONS-SCNC: 13 MMOL/L (ref 3–16)
AST SERPL-CCNC: 15 U/L (ref 15–37)
BILIRUB SERPL-MCNC: 0.4 MG/DL (ref 0–1)
BUN BLDV-MCNC: 20 MG/DL (ref 7–20)
CALCIUM SERPL-MCNC: 9.5 MG/DL (ref 8.3–10.6)
CHLORIDE BLD-SCNC: 100 MMOL/L (ref 99–110)
CHOLESTEROL, TOTAL: 238 MG/DL (ref 0–199)
CO2: 24 MMOL/L (ref 21–32)
CREAT SERPL-MCNC: 0.8 MG/DL (ref 0.9–1.3)
CREATININE URINE: 77.5 MG/DL (ref 39–259)
GFR AFRICAN AMERICAN: >60
GFR NON-AFRICAN AMERICAN: >60
GLOBULIN: 2.8 G/DL
GLUCOSE BLD-MCNC: 252 MG/DL (ref 70–99)
HDLC SERPL-MCNC: 39 MG/DL (ref 40–60)
LDL CHOLESTEROL CALCULATED: ABNORMAL MG/DL
LDL CHOLESTEROL DIRECT: 146 MG/DL
MICROALBUMIN UR-MCNC: 83.3 MG/DL
MICROALBUMIN/CREAT UR-RTO: 1074.8 MG/G (ref 0–30)
POTASSIUM SERPL-SCNC: 4.4 MMOL/L (ref 3.5–5.1)
SODIUM BLD-SCNC: 137 MMOL/L (ref 136–145)
TOTAL PROTEIN: 6.8 G/DL (ref 6.4–8.2)
TRIGL SERPL-MCNC: 401 MG/DL (ref 0–150)
VLDLC SERPL CALC-MCNC: ABNORMAL MG/DL

## 2021-06-15 RX ORDER — LISINOPRIL 5 MG/1
5 TABLET ORAL DAILY
Qty: 90 TABLET | Refills: 1 | Status: SHIPPED | OUTPATIENT
Start: 2021-06-15 | End: 2022-04-27 | Stop reason: SDUPTHER

## 2021-06-15 NOTE — TELEPHONE ENCOUNTER
Pharmacy needs an updated order for metFORMIN (GLUCOPHAGE) 1000 MG tablet they cannot see the brand name of this medication.           [address] [place] [address] [phone] Alejandro Wilson [phone]   [address]   Phone:  [phone]  Fax:  [phone]

## 2021-06-17 LAB
SEX HORMONE BINDING GLOBULIN: 19 NMOL/L (ref 11–80)
TESTOSTERONE FREE-NONMALE: 89.9 PG/ML (ref 47–244)
TESTOSTERONE TOTAL: 341 NG/DL (ref 220–1000)

## 2021-09-21 ENCOUNTER — OFFICE VISIT (OUTPATIENT)
Dept: ENDOCRINOLOGY | Age: 51
End: 2021-09-21
Payer: COMMERCIAL

## 2021-09-21 VITALS
HEART RATE: 67 BPM | WEIGHT: 151.4 LBS | SYSTOLIC BLOOD PRESSURE: 122 MMHG | OXYGEN SATURATION: 98 % | BODY MASS INDEX: 24.33 KG/M2 | DIASTOLIC BLOOD PRESSURE: 62 MMHG | HEIGHT: 66 IN

## 2021-09-21 DIAGNOSIS — E78.49 OTHER HYPERLIPIDEMIA: Primary | ICD-10-CM

## 2021-09-21 DIAGNOSIS — E11.9 TYPE 2 DIABETES MELLITUS WITHOUT COMPLICATION, WITHOUT LONG-TERM CURRENT USE OF INSULIN (HCC): ICD-10-CM

## 2021-09-21 LAB — HBA1C MFR BLD: 9.3 %

## 2021-09-21 PROCEDURE — 99214 OFFICE O/P EST MOD 30 MIN: CPT | Performed by: INTERNAL MEDICINE

## 2021-09-21 PROCEDURE — 83036 HEMOGLOBIN GLYCOSYLATED A1C: CPT | Performed by: INTERNAL MEDICINE

## 2021-09-21 RX ORDER — LANCETS 30 GAUGE
1 EACH MISCELLANEOUS DAILY
Qty: 100 EACH | Refills: 3 | Status: SHIPPED | OUTPATIENT
Start: 2021-09-21

## 2021-09-21 RX ORDER — DULAGLUTIDE 1.5 MG/.5ML
1.5 INJECTION, SOLUTION SUBCUTANEOUS WEEKLY
Qty: 4 PEN | Refills: 2 | Status: SHIPPED | OUTPATIENT
Start: 2021-09-21 | End: 2022-04-27 | Stop reason: SDUPTHER

## 2021-09-21 RX ORDER — BLOOD-GLUCOSE METER
EACH MISCELLANEOUS
Qty: 1 EACH | Refills: 1 | Status: SHIPPED | OUTPATIENT
Start: 2021-09-21 | End: 2022-05-10 | Stop reason: SDUPTHER

## 2021-09-21 RX ORDER — DULAGLUTIDE 0.75 MG/.5ML
INJECTION, SOLUTION SUBCUTANEOUS
Qty: 4 ML | Refills: 0 | Status: SHIPPED | OUTPATIENT
Start: 2021-09-21 | End: 2021-09-21 | Stop reason: SDUPTHER

## 2021-09-21 RX ORDER — DULAGLUTIDE 0.75 MG/.5ML
INJECTION, SOLUTION SUBCUTANEOUS
Qty: 4 ML | Refills: 5 | Status: SHIPPED | OUTPATIENT
Start: 2021-09-21 | End: 2021-09-21

## 2021-09-21 NOTE — TELEPHONE ENCOUNTER
Medication:   Requested Prescriptions     Pending Prescriptions Disp Refills    [facility]IC[facility] Administration 6.67 NA/1.3ZW SOPN [Pharmacy Med Name: Trulicity 3.49 QG/6.4SY Subcutaneous Solution Pen-injector] 4 mL 0     Sig: INJECT 1 PEN  SUBCUTANEOUSLY ONCE A WEEK       Last Filled:      Patient Phone Number: [phone] (home) [phone] (work)    Last appt: 6/14/2021   Next appt: 9/21/2021    Last Labs DM:   Lab Results   Component Value Date    LABA1C 12.6 06/14/2021

## 2021-09-21 NOTE — PROGRESS NOTES
Patient has a PMH of Type 2 DM, hypertension, hyperlipidemia,    Interim:  Glucose better   used    Has seen Dr. Dahlia Weeks      Still drinking regular soda    Diagnosed with Diabetes Mellitus type 2 > 10 yrs,  Course has been variable . Microvascular complications: Has retinopathy (Last eye exam: 07/19)   Has Nephropathy : albuminuria    Has Peripheral neuropathy. Home regimen:  Metformin 1000 mg Qdaily side effect with higher dose  Tresiba 14  units QHS  Trulicity    Previous medications:  Novolin N     Blood glucose trend  100s    Hypoglycemia: No    A1c 11.2 % ---> 11.2 % --->7.8 %---> 9.4%  ---> 9.3%    Diet: Eats 3  Meals/day. He stopped Drinks regular soda/juice all day. Nutrition education: No  Exercise: No    M/C 4/21 :   341---> 1046 on 6/21  Started lisinopril    Moderate, uncontrolled    Hyperlipidemia:  He has mixed hyperlipidemia  On atorvastatin 40 mg daily.  Not taking   on 4/21   not taking statin    Reports ED, not on medication    C/o tingling in outer thigh    Past Medical History:   Diagnosis Date    Atypical chest pain Feb 2015    GXT neg     Diabetes mellitus ([address]     GERD (gastroesophageal reflux disease)     Pleurisy Sep 2013     Past Surgical History:   Procedure Laterality Date    HAND SURGERY      both, 2010      Current Outpatient Medications   Medication Sig Dispense Refill    TRULICITY 3.56 SC/1.2IH SOPN INJECT 1 PEN  SUBCUTANEOUSLY ONCE A WEEK 4 mL 0    metFORMIN (GLUCOPHAGE) 1000 MG tablet Take 1 tablet by mouth daily (with breakfast) 30 tablet 5    lisinopril (PRINIVIL;ZESTRIL) 5 MG tablet Take 1 tablet by mouth daily 90 tablet 1    Insulin Degludec (TRESIBA FLEXTOUCH) 100 UNIT/ML SOPN 18 units daily 5 pen 2    atorvastatin (LIPITOR) 40 MG tablet Take 1 tablet by mouth daily 90 tablet 1    gabapentin (NEURONTIN) 300 MG capsule One capsule twice a day 60 capsule 2    Insulin Pen Needle (B-D UF III MINI PEN NEEDLES) 31G X 5 MM MISC 1 each by Does not apply route daily 100 each 6    Insulin Syringe-Needle U-100 (RELION INSULIN SYRINGE) 31G X 15/64\" 0.5 ML MISC Use as directed 100 each 0    glucose monitoring kit (FREESTYLE) monitoring kit 1 kit by Does not apply route daily 1 kit 0    blood glucose test strips (ASCENSIA AUTODISC VI;ONE TOUCH ULTRA TEST VI) strip 1 each by In Vitro route daily As needed. 100 each 3    Lancets MISC Contour Checks  each 3    ibuprofen (IBU) 600 MG tablet Take 1 tablet by mouth every 6 hours as needed for Pain Take with food 20 tablet 0    glucose blood VI test strips (ZOHRA CONTOUR TEST) strip Checks before breakfast and 2 hours after dinner 100 each 3    glucose monitoring kit (FREESTYLE) monitoring kit Check sugar bid 1 kit 0    omeprazole (PRILOSEC) 40 MG capsule Take one po qam 30 capsule 1     No current facility-administered medications for this visit. ROS: Scanned, reviewed            Lab Results   Component Value Date    LABA1C 12.6 06/14/2021         Assessment/Plan      1. Type 2 DM     Tory North is a 45 y.o. male has Type 2 DM     Uncontrolled, A1c very high  Needs to bring log    Complicated by retinopathy, albuminuria    -Continue metformin   -Continue Tresiba increase to 18 units QPM    Added trulicity, increase dose      Advised follow-up with the ophthalmologist once year. Urine microalbumin/cr ratio high on multiple measures. on lisinopril 5 mg daily. May add SGLT-2 inhbitor     Discussed foot care. BP at goal    2. Hyperlipidemia. On statins. Not taking restarted    3. ED: nl  testosterone, discussed importance of glucose control    4. Right thigh tingling: In outer thigh, may have meralgia paraesthetica. Advised to see PCP to rule out other causes.

## 2022-01-10 RX ORDER — INSULIN DEGLUDEC INJECTION 100 U/ML
INJECTION, SOLUTION SUBCUTANEOUS
Qty: 15 ML | Refills: 0 | Status: SHIPPED | OUTPATIENT
Start: 2022-01-10 | End: 2022-04-27 | Stop reason: SDUPTHER

## 2022-01-12 ENCOUNTER — TELEPHONE (OUTPATIENT)
Dept: ENDOCRINOLOGY | Age: 52
End: 2022-01-12

## 2022-01-12 NOTE — TELEPHONE ENCOUNTER
Submitted PA for [employer] FlexTouch (insulin degludec injection) 100 Units/mL solution  Key: [id number]   Via CMM STATUS: Denied

## 2022-01-17 ENCOUNTER — TELEPHONE (OUTPATIENT)
Dept: ENDOCRINOLOGY | Age: 52
End: 2022-01-17

## 2022-01-18 ENCOUNTER — TELEPHONE (OUTPATIENT)
Dept: ENDOCRINOLOGY | Age: 52
End: 2022-01-18

## 2022-02-07 DIAGNOSIS — E11.9 TYPE 2 DIABETES MELLITUS WITHOUT COMPLICATION, WITHOUT LONG-TERM CURRENT USE OF INSULIN (HCC): Primary | ICD-10-CM

## 2022-02-09 RX ORDER — GABAPENTIN 300 MG/1
CAPSULE ORAL
Qty: 60 CAPSULE | Refills: 0 | OUTPATIENT
Start: 2022-02-09

## 2022-04-26 DIAGNOSIS — E11.9 TYPE 2 DIABETES MELLITUS WITHOUT COMPLICATION, WITH LONG-TERM CURRENT USE OF INSULIN (HCC): Primary | ICD-10-CM

## 2022-04-26 DIAGNOSIS — Z79.4 TYPE 2 DIABETES MELLITUS WITHOUT COMPLICATION, WITH LONG-TERM CURRENT USE OF INSULIN (HCC): Primary | ICD-10-CM

## 2022-04-26 DIAGNOSIS — E78.5 HYPERLIPIDEMIA, UNSPECIFIED HYPERLIPIDEMIA TYPE: ICD-10-CM

## 2022-04-26 NOTE — TELEPHONE ENCOUNTER
Pt called for refills. Pending and pharmacy updated.      NOV- 05/10/22      Requested Prescriptions     Pending Prescriptions Disp Refills    lisinopril (PRINIVIL;ZESTRIL) 5 MG tablet 90 tablet 1     Sig: Take 1 tablet by mouth daily    atorvastatin (LIPITOR) 40 MG tablet 90 tablet 1     Sig: Take 1 tablet by mouth daily    metFORMIN (GLUCOPHAGE) 1000 MG tablet 30 tablet 5     Sig: Take 1 tablet by mouth daily (with breakfast)    Insulin Degludec (TRESIBA FLEXTOUCH) 100 UNIT/ML SOPN 15 mL 0     Sig: INJECT 18 UNITS SUBCUTANEOUSLY ONCE DAILY    Dulaglutide (TRULICITY) 1.5 TR/6.8DH SOPN 4 pen 2     Sig: Inject 1.5 mg into the skin once a week    gabapentin (NEURONTIN) 300 MG capsule 60 capsule 2     Sig: One capsule twice a day

## 2022-04-27 RX ORDER — ATORVASTATIN CALCIUM 40 MG/1
40 TABLET, FILM COATED ORAL DAILY
Qty: 90 TABLET | Refills: 1 | Status: SHIPPED | OUTPATIENT
Start: 2022-04-27

## 2022-04-27 RX ORDER — DULAGLUTIDE 1.5 MG/.5ML
1.5 INJECTION, SOLUTION SUBCUTANEOUS WEEKLY
Qty: 4 PEN | Refills: 2 | Status: SHIPPED | OUTPATIENT
Start: 2022-04-27 | End: 2022-09-15 | Stop reason: SDUPTHER

## 2022-04-27 RX ORDER — LISINOPRIL 5 MG/1
5 TABLET ORAL DAILY
Qty: 90 TABLET | Refills: 1 | Status: SHIPPED | OUTPATIENT
Start: 2022-04-27

## 2022-04-27 RX ORDER — INSULIN DEGLUDEC INJECTION 100 U/ML
INJECTION, SOLUTION SUBCUTANEOUS
Qty: 15 ML | Refills: 2 | Status: SHIPPED | OUTPATIENT
Start: 2022-04-27 | End: 2022-09-19 | Stop reason: SDUPTHER

## 2022-04-27 RX ORDER — GABAPENTIN 300 MG/1
CAPSULE ORAL
Qty: 60 CAPSULE | Refills: 2 | Status: SHIPPED | OUTPATIENT
Start: 2022-04-27 | End: 2023-04-26

## 2022-05-10 ENCOUNTER — OFFICE VISIT (OUTPATIENT)
Dept: ENDOCRINOLOGY | Age: 52
End: 2022-05-10
Payer: COMMERCIAL

## 2022-05-10 VITALS
OXYGEN SATURATION: 98 % | HEIGHT: 66 IN | DIASTOLIC BLOOD PRESSURE: 51 MMHG | BODY MASS INDEX: 24.2 KG/M2 | WEIGHT: 150.6 LBS | HEART RATE: 78 BPM | SYSTOLIC BLOOD PRESSURE: 91 MMHG

## 2022-05-10 DIAGNOSIS — E78.5 HYPERLIPIDEMIA, UNSPECIFIED HYPERLIPIDEMIA TYPE: ICD-10-CM

## 2022-05-10 DIAGNOSIS — Z79.4 TYPE 2 DIABETES MELLITUS WITHOUT COMPLICATION, WITH LONG-TERM CURRENT USE OF INSULIN (HCC): ICD-10-CM

## 2022-05-10 DIAGNOSIS — E11.9 TYPE 2 DIABETES MELLITUS WITHOUT COMPLICATION, WITH LONG-TERM CURRENT USE OF INSULIN (HCC): ICD-10-CM

## 2022-05-10 DIAGNOSIS — E78.5 HYPERLIPIDEMIA, UNSPECIFIED HYPERLIPIDEMIA TYPE: Primary | ICD-10-CM

## 2022-05-10 LAB
A/G RATIO: 1.6 (ref 1.1–2.2)
ALBUMIN SERPL-MCNC: 4.5 G/DL (ref 3.4–5)
ALP BLD-CCNC: 97 U/L (ref 40–129)
ALT SERPL-CCNC: 25 U/L (ref 10–40)
ANION GAP SERPL CALCULATED.3IONS-SCNC: 13 MMOL/L (ref 3–16)
AST SERPL-CCNC: 19 U/L (ref 15–37)
BILIRUB SERPL-MCNC: 0.6 MG/DL (ref 0–1)
BUN BLDV-MCNC: 28 MG/DL (ref 7–20)
CALCIUM SERPL-MCNC: 9.7 MG/DL (ref 8.3–10.6)
CHLORIDE BLD-SCNC: 101 MMOL/L (ref 99–110)
CHOLESTEROL, TOTAL: 137 MG/DL (ref 0–199)
CO2: 22 MMOL/L (ref 21–32)
CREAT SERPL-MCNC: 1.1 MG/DL (ref 0.9–1.3)
CREATININE URINE: 122.9 MG/DL (ref 39–259)
GFR AFRICAN AMERICAN: >60
GFR NON-AFRICAN AMERICAN: >60
GLUCOSE BLD-MCNC: 87 MG/DL (ref 70–99)
HBA1C MFR BLD: 7.3 %
HDLC SERPL-MCNC: 52 MG/DL (ref 40–60)
LDL CHOLESTEROL CALCULATED: 71 MG/DL
MICROALBUMIN UR-MCNC: 10.4 MG/DL
MICROALBUMIN/CREAT UR-RTO: 84.6 MG/G (ref 0–30)
POTASSIUM SERPL-SCNC: 4.5 MMOL/L (ref 3.5–5.1)
SODIUM BLD-SCNC: 136 MMOL/L (ref 136–145)
TOTAL PROTEIN: 7.3 G/DL (ref 6.4–8.2)
TRIGL SERPL-MCNC: 71 MG/DL (ref 0–150)
VLDLC SERPL CALC-MCNC: 14 MG/DL

## 2022-05-10 PROCEDURE — 83036 HEMOGLOBIN GLYCOSYLATED A1C: CPT | Performed by: INTERNAL MEDICINE

## 2022-05-10 PROCEDURE — 99214 OFFICE O/P EST MOD 30 MIN: CPT | Performed by: INTERNAL MEDICINE

## 2022-05-10 RX ORDER — BLOOD-GLUCOSE METER
EACH MISCELLANEOUS
Qty: 1 EACH | Refills: 1 | Status: SHIPPED | OUTPATIENT
Start: 2022-05-10

## 2022-05-10 RX ORDER — LANCETS 30 GAUGE
1 EACH MISCELLANEOUS 2 TIMES DAILY
Qty: 300 EACH | Refills: 1 | Status: SHIPPED | OUTPATIENT
Start: 2022-05-10

## 2022-05-10 RX ORDER — SILDENAFIL 50 MG/1
50 TABLET, FILM COATED ORAL DAILY PRN
Qty: 5 TABLET | Refills: 0 | Status: SHIPPED | OUTPATIENT
Start: 2022-05-10 | End: 2022-06-01 | Stop reason: SDUPTHER

## 2022-05-10 NOTE — PROGRESS NOTES
Patient has a PMH of Type 2 DM, hypertension, hyperlipidemia,    Interim:    Seen after 9/21  High glucose during the day  Morning better  Reports some right abdominal pain,burrose marie  C/o ED    Has seen Dr. Nola Quick    Still drinking regular soda    Diagnosed with Diabetes Mellitus type 2 > 10 yrs,  Course has been variable . Microvascular complications: Has retinopathy (Last eye exam: 07/19)   Has Nephropathy : albuminuria    Has Peripheral neuropathy. Home regimen:  Metformin 1000 mg Qdaily side effect with higher dose  Tresiba 22  units QHS  Trulicity 1.5    Previous medications:  Novolin N     Blood glucose trend  100s    Did not bring meter    Hypoglycemia: No    A1c 11.2 % ---> 11.2 % --->7.8 %---> 9.4%  ---> 9.3%-----> 7.3%    Diet: Eats 3  Meals/day. He stopped Drinks regular soda/juice all day. Nutrition education: No  Exercise: No    M/C 4/21 :   341---> 1046 on 6/21  Started lisinopril    Moderate, uncontrolled    Hyperlipidemia:  He has mixed hyperlipidemia  On atorvastatin 40 mg daily.  Not taking   on 4/21   not taking statin    Taking statin regularly now    Reports ED, not on medication    C/o tingling in outer thigh    On neurontin    It helped    Past Medical History:   Diagnosis Date    Atypical chest pain Feb 2015    GXT neg     Diabetes mellitus ([address]     GERD (gastroesophageal reflux disease)     Pleurisy Sep 2013     Past Surgical History:   Procedure Laterality Date    HAND SURGERY      both, 2010      Current Outpatient Medications   Medication Sig Dispense Refill    lisinopril (PRINIVIL;ZESTRIL) 5 MG tablet Take 1 tablet by mouth daily 90 tablet 1    atorvastatin (LIPITOR) 40 MG tablet Take 1 tablet by mouth daily 90 tablet 1    metFORMIN (GLUCOPHAGE) 1000 MG tablet Take 1 tablet by mouth daily (with breakfast) 30 tablet 5    Insulin Degludec (TRESIBA FLEXTOUCH) 100 UNIT/ML SOPN INJECT 18 UNITS SUBCUTANEOUSLY ONCE DAILY 15 mL 2    Dulaglutide (TRULICITY) 1.5 MG/0.5ML SOPN Inject 1.5 mg into the skin once a week 4 pen 2    gabapentin (NEURONTIN) 300 MG capsule One capsule twice a day 60 capsule 2    blood glucose test strips (ZOHRA CONTOUR TEST) strip Checks before breakfast and 2 hours after dinner 100 each 3    Blood Glucose Monitoring Suppl (CONTOUR MONITOR) JENNIFER As needed 1 each 1    Lancets MISC 1 each by Does not apply route daily 100 each 3    Insulin Pen Needle (B-D UF III MINI PEN NEEDLES) 31G X 5 MM MISC 1 each by Does not apply route daily 100 each 6    Insulin Syringe-Needle U-100 (RELION INSULIN SYRINGE) 31G X 15/64\" 0.5 ML MISC Use as directed 100 each 0    glucose monitoring kit (FREESTYLE) monitoring kit 1 kit by Does not apply route daily 1 kit 0    blood glucose test strips (ASCENSIA AUTODISC VI;ONE TOUCH ULTRA TEST VI) strip 1 each by In Vitro route daily As needed. 100 each 3    Lancets MISC Contour Checks  each 3    ibuprofen (IBU) 600 MG tablet Take 1 tablet by mouth every 6 hours as needed for Pain Take with food 20 tablet 0    glucose monitoring kit (FREESTYLE) monitoring kit Check sugar bid 1 kit 0    omeprazole (PRILOSEC) 40 MG capsule Take one po qam 30 capsule 1     No current facility-administered medications for this visit. ROS: Scanned, reviewed    Vitals:    05/10/22 1152   BP: (!) 91/51   Pulse: 78   SpO2: 98%       Constitutional: Well-developed, appears stated age, cooperative, in no acute distress  H/E/N/M/T:atraumatic, normocephalic, external ears, nose, lips normal without lesions  Eyes: Lids, lashes, conjunctivae and sclerae normal, No proptosis, no redness  Neck: supple, symmetrical, no swelling  Skin: No obvious rashes or lesions present.   Skin and hair texture normal  Psychiatric: Judgement and Insight:  judgement and insight appear normal  Neuro: Normal without focal findings, speech is normal normal, speech is spontaneous  Chest: No labored breathing, no chest deformity, no stridor  Musculoskeletal: No joint deformity, swelling          Lab Results   Component Value Date    LABA1C 9.3 09/21/2021         Assessment/Plan      1. Type 2 DM     Cecelia Gomez is a 47 y.o. male has Type 2 DM     Uncontrolled, A1c very high, now improved  Needs to bring log    Complicated by retinopathy, albuminuria    -Continue metformin   -Continue Tresiba increase to 22 units QPM    Added trulicity  Advised can hold for 2 weeks and assess if GI symptoms improve  If does not improve, see PCP      Advised follow-up with the ophthalmologist once year. Due  Urine microalbumin/cr ratio high on multiple measures. on lisinopril 5 mg daily. May add SGLT-2 inhbitor     Discussed foot care. BP at goal    2. Hyperlipidemia. On statins. Not taking restarted    3. ED: nl  testosterone, discussed importance of glucose control    4. Right thigh tingling: In outer thigh, may have meralgia paraesthetica. Advised to see PCP to rule out other causes. Neurontin has helped    5. ED: Testosterone normal, start viagra

## 2022-06-01 RX ORDER — SILDENAFIL 50 MG/1
50 TABLET, FILM COATED ORAL DAILY PRN
Qty: 5 TABLET | Refills: 0 | Status: SHIPPED | OUTPATIENT
Start: 2022-06-01 | End: 2022-07-07

## 2022-06-01 NOTE — TELEPHONE ENCOUNTER
Medication:   Requested Prescriptions     Pending Prescriptions Disp Refills    sildenafil (VIAGRA) 50 MG tablet 5 tablet 0     Sig: Take 1 tablet by mouth daily as needed for Erectile Dysfunction       Last Filled:      Patient Phone Number: [phone] (home) [phone] (work)    Last appt: 5/10/2022   Next appt: 8/29/2022    Last Labs DM:   Lab Results   Component Value Date    LABA1C 7.3 05/10/2022     Last Lipid:   Lab Results   Component Value Date    CHOL 137 05/10/2022    TRIG 71 05/10/2022    HDL 52 05/10/2022    LDLCALC 71 05/10/2022     Last PSA: No results found for: PSA  Last Thyroid:   Lab Results   Component Value Date    TSH 2.32 04/10/2020

## 2022-06-01 NOTE — TELEPHONE ENCOUNTER
Pt calling and states he needs a refill on his Viagra send to [facility] on [facility]  in [place]    CB# [phone]

## 2022-06-30 ENCOUNTER — TELEPHONE (OUTPATIENT)
Dept: ENDOCRINOLOGY | Age: 52
End: 2022-06-30

## 2022-06-30 NOTE — TELEPHONE ENCOUNTER
Pt requesting refill  blood glucose test strips (ZOHRA CONTOUR TEST) strip     sildenafil (VIAGRA) 50 MG tablet              [facility] [address] ROWENA MOORE [address] [place] [address] [phone]   [address] [facility] [address]   Phone:  [phone]  Fax:  [phone]

## 2022-07-07 RX ORDER — SILDENAFIL 50 MG/1
TABLET, FILM COATED ORAL
Qty: 5 TABLET | Refills: 0 | Status: SHIPPED | OUTPATIENT
Start: 2022-07-07 | End: 2022-08-17

## 2022-07-07 NOTE — TELEPHONE ENCOUNTER
Medication:   Requested Prescriptions     Pending Prescriptions Disp Refills    sildenafil (VIAGRA) 50 MG tablet [Pharmacy Med Name: Sildenafil Citrate 50 MG Oral Tablet] 5 tablet 0     Sig: TAKE 1 TABLET BY MOUTH ONCE DAILY AS NEEDED FOR ERECTILE DYSFUNCTION        Last Filled:      Patient Phone Number: [phone] (home) [phone] (work)    Last appt: 5/10/2022   Next appt: 8/29/2022    Last OARRS: No flowsheet data found.

## 2022-08-17 RX ORDER — SILDENAFIL 50 MG/1
TABLET, FILM COATED ORAL
Qty: 5 TABLET | Refills: 0 | Status: SHIPPED | OUTPATIENT
Start: 2022-08-17 | End: 2022-09-19 | Stop reason: SDUPTHER

## 2022-08-17 NOTE — TELEPHONE ENCOUNTER
Medication:   Requested Prescriptions     Pending Prescriptions Disp Refills    sildenafil (VIAGRA) 50 MG tablet [Pharmacy Med Name: Sildenafil Citrate 50 MG Oral Tablet] 5 tablet 0     Sig: TAKE 1 TABLET BY MOUTH ONCE DAILY AS NEEDED FOR ERECTILE DYSFUNCTION       Last Filled:      Patient Phone Number: [phone] (home) [phone] (work)    Last appt: 5/10/2022   Next appt: 8/29/2022    Last Labs DM:   Lab Results   Component Value Date/Time    LABA1C 7.3 05/10/2022 02:17 PM     Last Lipid:   Lab Results   Component Value Date/Time    CHOL 137 05/10/2022 12:26 PM    TRIG 71 05/10/2022 12:26 PM    HDL 52 05/10/2022 12:26 PM    [facility] 71 05/10/2022 12:26 PM     Last PSA: No results found for: PSA  Last Thyroid:   Lab Results   Component Value Date/Time    TSH 2.32 04/10/2020 09:01 AM

## 2022-09-09 DIAGNOSIS — E11.9 TYPE 2 DIABETES MELLITUS WITHOUT COMPLICATION, WITH LONG-TERM CURRENT USE OF INSULIN (HCC): ICD-10-CM

## 2022-09-09 DIAGNOSIS — Z79.4 TYPE 2 DIABETES MELLITUS WITHOUT COMPLICATION, WITH LONG-TERM CURRENT USE OF INSULIN (HCC): ICD-10-CM

## 2022-09-12 RX ORDER — GABAPENTIN 300 MG/1
CAPSULE ORAL
Qty: 60 CAPSULE | Refills: 0 | OUTPATIENT
Start: 2022-09-12

## 2022-09-12 RX ORDER — DULAGLUTIDE 1.5 MG/.5ML
INJECTION, SOLUTION SUBCUTANEOUS
Qty: 4 ML | Refills: 0 | OUTPATIENT
Start: 2022-09-12

## 2022-09-12 RX ORDER — SILDENAFIL 50 MG/1
TABLET, FILM COATED ORAL
Qty: 5 TABLET | Refills: 0 | OUTPATIENT
Start: 2022-09-12

## 2022-09-12 RX ORDER — INSULIN DEGLUDEC INJECTION 100 U/ML
INJECTION, SOLUTION SUBCUTANEOUS
Qty: 15 ML | Refills: 0 | OUTPATIENT
Start: 2022-09-12

## 2022-09-12 NOTE — TELEPHONE ENCOUNTER
Medication:   Requested Prescriptions     Pending Prescriptions Disp Refills    TRESIBA FLEXTOUCH 100 UNIT/ML SOPN [Pharmacy Med Name: Gilbert Garza FlexTouch 100 UNIT/ML Subcutaneous Solution Pen-injector] 15 mL 0     Sig: INJECT 18 UNITS SUBCUTANEOUSLY ONCE DAILY    TRULICITY 1.5 LO/8.9VO SOPN [Pharmacy Med Name: Trulicity 1.5 XN/7.9YN Subcutaneous Solution Pen-injector] 4 mL 0     Sig: INJECT 1 PEN (1.5MG)  SUBCUTANEOUSLY ONCE DAILY    gabapentin (NEURONTIN) 300 MG capsule [Pharmacy Med Name: Gabapentin 300 MG Oral Capsule] 60 capsule 0     Sig: Take 1 capsule by mouth twice daily    sildenafil (VIAGRA) 50 MG tablet [Pharmacy Med Name: Sildenafil Citrate 50 MG Oral Tablet] 5 tablet 0     Sig: TAKE 1 TABLET BY MOUTH ONCE DAILY AS NEEDED FOR ERECTILE DYSFUNCTION       Last Filled:      Patient Phone Number: [phone] (home) [phone] (work)    Last appt: 5/10/2022   Next appt: Visit date not found    Last Labs DM:   Lab Results   Component Value Date/Time    LABA1C 7.3 05/10/2022 02:17 PM     Last Lipid:   Lab Results   Component Value Date/Time    CHOL 137 05/10/2022 12:26 PM    TRIG 71 05/10/2022 12:26 PM    HDL 52 05/10/2022 12:26 PM    [facility] 71 05/10/2022 12:26 PM     Last PSA: No results found for: PSA  Last Thyroid:   Lab Results   Component Value Date/Time    TSH 2.32 04/10/2020 09:01 AM

## 2022-09-15 ENCOUNTER — TELEPHONE (OUTPATIENT)
Dept: ENDOCRINOLOGY | Age: 52
End: 2022-09-15

## 2022-09-15 DIAGNOSIS — Z79.4 TYPE 2 DIABETES MELLITUS WITHOUT COMPLICATION, WITH LONG-TERM CURRENT USE OF INSULIN (HCC): ICD-10-CM

## 2022-09-15 DIAGNOSIS — E11.9 TYPE 2 DIABETES MELLITUS WITHOUT COMPLICATION, WITH LONG-TERM CURRENT USE OF INSULIN (HCC): ICD-10-CM

## 2022-09-15 RX ORDER — DULAGLUTIDE 1.5 MG/.5ML
1.5 INJECTION, SOLUTION SUBCUTANEOUS WEEKLY
Qty: 4 ADJUSTABLE DOSE PRE-FILLED PEN SYRINGE | Refills: 3 | Status: SHIPPED | OUTPATIENT
Start: 2022-09-15 | End: 2022-09-19

## 2022-09-15 NOTE — TELEPHONE ENCOUNTER
Medication:   Requested Prescriptions     Pending Prescriptions Disp Refills    Dulaglutide (TRULICITY) 1.5 TM/1.8VD SOPN 4 Adjustable Dose Pre-filled Pen Syringe 3     Sig: Inject 1.5 mg into the skin once a week       Last Filled:      Patient Phone Number: [phone] (home) [phone] (work)    Last appt: 5/10/2022   Next appt: 9/19/2022    Last Labs DM:   Lab Results   Component Value Date/Time    LABA1C 7.3 05/10/2022 02:17 PM

## 2022-09-15 NOTE — TELEPHONE ENCOUNTER
Patient is requesting refills of his Dulaglutide (TRULICITY) 1.5 XF/4.7TN SOPN and Insulin Degludec [facility]) 100 UNIT/ML [address], [address] [place] [address]   Phone:  [phone]  Fax:  [phone]

## 2022-09-19 ENCOUNTER — OFFICE VISIT (OUTPATIENT)
Dept: ENDOCRINOLOGY | Age: 52
End: 2022-09-19
Payer: COMMERCIAL

## 2022-09-19 VITALS
WEIGHT: 146.4 LBS | DIASTOLIC BLOOD PRESSURE: 50 MMHG | BODY MASS INDEX: 23.53 KG/M2 | OXYGEN SATURATION: 97 % | SYSTOLIC BLOOD PRESSURE: 93 MMHG | HEART RATE: 80 BPM | HEIGHT: 66 IN

## 2022-09-19 DIAGNOSIS — Z79.4 TYPE 2 DIABETES MELLITUS WITHOUT COMPLICATION, WITH LONG-TERM CURRENT USE OF INSULIN (HCC): Primary | ICD-10-CM

## 2022-09-19 DIAGNOSIS — E11.9 TYPE 2 DIABETES MELLITUS WITHOUT COMPLICATION, WITH LONG-TERM CURRENT USE OF INSULIN (HCC): Primary | ICD-10-CM

## 2022-09-19 LAB — HBA1C MFR BLD: 8.1 %

## 2022-09-19 PROCEDURE — 99214 OFFICE O/P EST MOD 30 MIN: CPT | Performed by: INTERNAL MEDICINE

## 2022-09-19 PROCEDURE — 83036 HEMOGLOBIN GLYCOSYLATED A1C: CPT | Performed by: INTERNAL MEDICINE

## 2022-09-19 PROCEDURE — 3051F HG A1C>EQUAL 7.0%<8.0%: CPT | Performed by: INTERNAL MEDICINE

## 2022-09-19 RX ORDER — INSULIN DEGLUDEC INJECTION 100 U/ML
INJECTION, SOLUTION SUBCUTANEOUS
Qty: 15 ML | Refills: 2 | Status: SHIPPED | OUTPATIENT
Start: 2022-09-19

## 2022-09-19 RX ORDER — DULAGLUTIDE 3 MG/.5ML
INJECTION, SOLUTION SUBCUTANEOUS
Qty: 4 ADJUSTABLE DOSE PRE-FILLED PEN SYRINGE | Refills: 3 | Status: SHIPPED | OUTPATIENT
Start: 2022-09-19

## 2022-09-19 RX ORDER — SILDENAFIL 50 MG/1
TABLET, FILM COATED ORAL
Qty: 10 TABLET | Refills: 2 | Status: SHIPPED | OUTPATIENT
Start: 2022-09-19

## 2022-09-19 NOTE — PROGRESS NOTES
Patient has a PMH of Type 2 DM, hypertension, hyperlipidemia,    Interim:    Fair control  3 weeks ran out of trulicity    C/o ED    Has seen Dr. Zaida Mercer    Still drinking regular soda    Diagnosed with Diabetes Mellitus type 2 > 10 yrs,  Course has been variable . Microvascular complications: Has retinopathy (Last eye exam: 07/19)   Has Nephropathy : albuminuria    Has Peripheral neuropathy. Home regimen:  Metformin 1000 mg Qdaily side effect with higher dose  Tresiba 26  units QHS  Trulicity 1.5    Previous medications:  Novolin N     Blood glucose trend      Hypoglycemia: No    A1c 11.2 % ---> 11.2 % --->7.8 %---> 9.4%  ---> 9.3%-----> 7.3%----> 8.1%    Diet: Eats 3  Meals/day. He stopped Drinks regular soda/juice all day. Nutrition education: No  Exercise: No    M/C 4/21 :   341---> 1046 on 6/21---> 84.6 on 5/22  Started lisinopril    Moderate, uncontrolled    Hyperlipidemia:  He has mixed hyperlipidemia  On atorvastatin 40 mg daily.  Not taking   on 4/21   not taking statin    Taking statin regularly now    LDL 71 on 5/22    Reports ED, not on medication    C/o tingling in outer thigh    On neurontin    It helped    Past Medical History:   Diagnosis Date    Atypical chest pain Feb 2015    GXT neg     Diabetes mellitus ([address]     GERD (gastroesophageal reflux disease)     Pleurisy Sep 2013     Past Surgical History:   Procedure Laterality Date    HAND SURGERY      both, 2010      Current Outpatient Medications   Medication Sig Dispense Refill    Dulaglutide (TRULICITY) 1.5 TK/3.0MV SOPN Inject 1.5 mg into the skin once a week 4 Adjustable Dose Pre-filled Pen Syringe 3    sildenafil (VIAGRA) 50 MG tablet TAKE 1 TABLET BY MOUTH ONCE DAILY AS NEEDED FOR ERECTILE DYSFUNCTION 5 tablet 0    blood glucose test strips (ZOHRA CONTOUR TEST) strip Checks before breakfast and 2 hours after dinner 100 each 3    Blood Glucose Monitoring Suppl (CONTOUR MONITOR) JENNIFER As needed 1 each 1    Lancets MISC 1 each by Does not apply route 2 times daily 300 each 1    lisinopril (PRINIVIL;ZESTRIL) 5 MG tablet Take 1 tablet by mouth daily 90 tablet 1    atorvastatin (LIPITOR) 40 MG tablet Take 1 tablet by mouth daily 90 tablet 1    metFORMIN (GLUCOPHAGE) 1000 MG tablet Take 1 tablet by mouth daily (with breakfast) 30 tablet 5    Insulin Degludec (TRESIBA FLEXTOUCH) 100 UNIT/ML SOPN INJECT 18 UNITS SUBCUTANEOUSLY ONCE DAILY 15 mL 2    gabapentin (NEURONTIN) 300 MG capsule One capsule twice a day 60 capsule 2    Lancets MISC 1 each by Does not apply route daily 100 each 3    Insulin Pen Needle (B-D UF III MINI PEN NEEDLES) 31G X 5 MM MISC 1 each by Does not apply route daily 100 each 6    Insulin Syringe-Needle U-100 (RELION INSULIN SYRINGE) 31G X 15/64\" 0.5 ML MISC Use as directed 100 each 0    glucose monitoring kit (FREESTYLE) monitoring kit 1 kit by Does not apply route daily 1 kit 0    blood glucose test strips (ASCENSIA AUTODISC VI;ONE TOUCH ULTRA TEST VI) strip 1 each by In Vitro route daily As needed. 100 each 3    Lancets MISC Contour Checks  each 3    ibuprofen (IBU) 600 MG tablet Take 1 tablet by mouth every 6 hours as needed for Pain Take with food 20 tablet 0    glucose monitoring kit (FREESTYLE) monitoring kit Check sugar bid 1 kit 0    omeprazole (PRILOSEC) 40 MG capsule Take one po qam 30 capsule 1     No current facility-administered medications for this visit. ROS: Scanned, reviewed    Vitals:    09/19/22 1330   BP: (!) 93/50   Pulse: 80   SpO2: 97%       Constitutional: Well-developed, appears stated age, cooperative, in no acute distress  H/E/N/M/T:atraumatic, normocephalic, external ears, nose, lips normal without lesions  Eyes: Lids, lashes, conjunctivae and sclerae normal, No proptosis, no redness  Neck: supple, symmetrical, no swelling  Skin: No obvious rashes or lesions present.   Skin and hair texture normal  Psychiatric: Judgement and Insight:  judgement and insight appear normal  Neuro: Normal without focal findings, speech is normal normal, speech is spontaneous  Chest: No labored breathing, no chest deformity, no stridor  Musculoskeletal: No joint deformity, swelling          Lab Results   Component Value Date    LABA1C 7.3 05/10/2022         Assessment/Plan      1. Type 2 DM     Foster Figueroa is a 47 y.o. male has Type 2 DM     Uncontrolled, A1c high  Had ran out of trulicity    Complicated by retinopathy, albuminuria    -Continue metformin   -Continue Tresiba increase to 30units QPM  -Increase trulicity    Needs more glucose checks      Advised follow-up with the ophthalmologist once year. Due  Urine microalbumin/cr ratio high on multiple measures. on lisinopril 5 mg daily. May add SGLT-2 inhbitor     Discussed foot care. BP at goal    2. Hyperlipidemia. On statins. LDL at goal    3. ED: nl  testosterone, discussed importance of glucose control    4. Right thigh tingling: In outer thigh, may have meralgia paraesthetica. Advised to see PCP to rule out other causes. Neurontin has helped    5. ED: Testosterone normal, started viagra

## 2022-12-07 DIAGNOSIS — E11.9 TYPE 2 DIABETES MELLITUS WITHOUT COMPLICATION, WITH LONG-TERM CURRENT USE OF INSULIN (HCC): ICD-10-CM

## 2022-12-07 DIAGNOSIS — Z79.4 TYPE 2 DIABETES MELLITUS WITHOUT COMPLICATION, WITH LONG-TERM CURRENT USE OF INSULIN (HCC): ICD-10-CM

## 2022-12-07 DIAGNOSIS — E78.5 HYPERLIPIDEMIA, UNSPECIFIED HYPERLIPIDEMIA TYPE: ICD-10-CM

## 2022-12-08 RX ORDER — ATORVASTATIN CALCIUM 40 MG/1
TABLET, FILM COATED ORAL
Qty: 90 TABLET | Refills: 0 | Status: SHIPPED | OUTPATIENT
Start: 2022-12-08

## 2022-12-08 RX ORDER — LISINOPRIL 5 MG/1
TABLET ORAL
Qty: 30 TABLET | Refills: 1 | Status: SHIPPED | OUTPATIENT
Start: 2022-12-08

## 2022-12-08 NOTE — TELEPHONE ENCOUNTER
Medication:   Requested Prescriptions     Pending Prescriptions Disp Refills    metFORMIN (GLUCOPHAGE) 1000 MG tablet [Pharmacy Med Name: metFORMIN HCl 1000 MG Oral Tablet] 30 tablet 1     Sig: Take 1 tablet by mouth once daily with breakfast    lisinopril (PRINIVIL;ZESTRIL) 5 MG tablet [Pharmacy Med Name: Lisinopril 5 MG Oral Tablet] 30 tablet 1     Sig: Take 1 tablet by mouth once daily    atorvastatin (LIPITOR) 40 MG tablet [Pharmacy Med Name: Atorvastatin Calcium 40 MG Oral Tablet] 90 tablet 0     Sig: Take 1 tablet by mouth once daily       Last Filled:      Patient Phone Number: [phone] (home) [phone] (work)    Last appt: 9/19/2022   Next appt: 1/9/2023    Last Labs DM:   Lab Results   Component Value Date/Time    LABA1C 8.1 09/19/2022 02:42 PM     Last Lipid:   Lab Results   Component Value Date/Time    CHOL 137 05/10/2022 12:26 PM    TRIG 71 05/10/2022 12:26 PM    HDL 52 05/10/2022 12:26 PM    [address] 71 05/10/2022 12:26 PM     Last PSA: No results found for: PSA  Last Thyroid:   Lab Results   Component Value Date/Time    TSH 2.32 04/10/2020 09:01 AM

## 2023-01-09 ENCOUNTER — OFFICE VISIT (OUTPATIENT)
Dept: ENDOCRINOLOGY | Age: 53
End: 2023-01-09
Payer: COMMERCIAL

## 2023-01-09 VITALS
HEART RATE: 61 BPM | HEIGHT: 66 IN | WEIGHT: 152 LBS | SYSTOLIC BLOOD PRESSURE: 130 MMHG | BODY MASS INDEX: 24.43 KG/M2 | RESPIRATION RATE: 14 BRPM | TEMPERATURE: 98 F | DIASTOLIC BLOOD PRESSURE: 69 MMHG

## 2023-01-09 DIAGNOSIS — Z79.4 TYPE 2 DIABETES MELLITUS WITHOUT COMPLICATION, WITH LONG-TERM CURRENT USE OF INSULIN (HCC): Primary | ICD-10-CM

## 2023-01-09 DIAGNOSIS — E11.9 TYPE 2 DIABETES MELLITUS WITHOUT COMPLICATION, WITH LONG-TERM CURRENT USE OF INSULIN (HCC): Primary | ICD-10-CM

## 2023-01-09 DIAGNOSIS — E78.5 HYPERLIPIDEMIA, UNSPECIFIED HYPERLIPIDEMIA TYPE: ICD-10-CM

## 2023-01-09 LAB — HBA1C MFR BLD: 8.3 %

## 2023-01-09 PROCEDURE — 99214 OFFICE O/P EST MOD 30 MIN: CPT | Performed by: INTERNAL MEDICINE

## 2023-01-09 PROCEDURE — 83036 HEMOGLOBIN GLYCOSYLATED A1C: CPT | Performed by: INTERNAL MEDICINE

## 2023-01-09 PROCEDURE — 3052F HG A1C>EQUAL 8.0%<EQUAL 9.0%: CPT | Performed by: INTERNAL MEDICINE

## 2023-01-09 RX ORDER — LISINOPRIL 5 MG/1
TABLET ORAL
Qty: 90 TABLET | Refills: 1 | Status: SHIPPED | OUTPATIENT
Start: 2023-01-09

## 2023-01-09 RX ORDER — PEN NEEDLE, DIABETIC 31 GX5/16"
1 NEEDLE, DISPOSABLE MISCELLANEOUS DAILY
Qty: 100 EACH | Refills: 6 | Status: SHIPPED | OUTPATIENT
Start: 2023-01-09

## 2023-01-09 RX ORDER — ATORVASTATIN CALCIUM 40 MG/1
TABLET, FILM COATED ORAL
Qty: 90 TABLET | Refills: 1 | Status: SHIPPED | OUTPATIENT
Start: 2023-01-09

## 2023-01-09 RX ORDER — GABAPENTIN 300 MG/1
CAPSULE ORAL
Qty: 60 CAPSULE | Refills: 3 | Status: SHIPPED | OUTPATIENT
Start: 2023-01-09 | End: 2024-01-08

## 2023-01-09 RX ORDER — INSULIN DEGLUDEC INJECTION 100 U/ML
INJECTION, SOLUTION SUBCUTANEOUS
Qty: 15 ML | Refills: 2 | Status: CANCELLED | OUTPATIENT
Start: 2023-01-09

## 2023-01-09 RX ORDER — DULAGLUTIDE 3 MG/.5ML
INJECTION, SOLUTION SUBCUTANEOUS
Qty: 4 ADJUSTABLE DOSE PRE-FILLED PEN SYRINGE | Refills: 3 | Status: SHIPPED | OUTPATIENT
Start: 2023-01-09

## 2023-01-09 RX ORDER — LANCETS
1 EACH MISCELLANEOUS 4 TIMES DAILY
Qty: 100 EACH | Refills: 6 | Status: SHIPPED | OUTPATIENT
Start: 2023-01-09

## 2023-01-09 RX ORDER — BLOOD SUGAR DIAGNOSTIC
STRIP MISCELLANEOUS
Qty: 100 STRIP | Refills: 5 | Status: SHIPPED | OUTPATIENT
Start: 2023-01-09

## 2023-01-09 RX ORDER — INSULIN GLARGINE 100 [IU]/ML
30 INJECTION, SOLUTION SUBCUTANEOUS NIGHTLY
Qty: 15 ADJUSTABLE DOSE PRE-FILLED PEN SYRINGE | Refills: 1 | Status: SHIPPED | OUTPATIENT
Start: 2023-01-09

## 2023-01-09 RX ORDER — BLOOD-GLUCOSE METER
1 EACH MISCELLANEOUS DAILY
Qty: 1 KIT | Refills: 0 | Status: SHIPPED | OUTPATIENT
Start: 2023-01-09

## 2023-01-09 NOTE — PROGRESS NOTES
Patient has a PMH of Type 2 DM, hypertension, hyperlipidemia,    Interim:    Tresiba costly      C/o ED    Has seen Dr. Lianet Francois drinking regular soda    Diagnosed with Diabetes Mellitus type 2 > 10 yrs,  Course has been variable . Microvascular complications: Has retinopathy (Last eye exam: 07/19)   Has Nephropathy : albuminuria    Has Peripheral neuropathy. Home regimen:  Metformin 1000 mg Qdaily side effect with higher dose  Tresiba 30  units QHS  Trulicity 1.5    Previous medications:  Novolin N     Blood glucose trend      Hypoglycemia: No    A1c 11.2 % ---> 11.2 % --->7.8 %---> 9.4%  ---> 9.3%-----> 7.3%----> 8.1%---> 8.3%    Diet: Eats 3  Meals/day. He stopped Drinks regular soda/juice all day. Nutrition education: No  Exercise: No    M/C 4/21 :   341---> 1046 on 6/21---> 84.6 on 5/22  Started lisinopril    Moderate, uncontrolled    Hyperlipidemia:  He has mixed hyperlipidemia  On atorvastatin 40 mg daily.  Not taking   on 4/21   not taking statin    Taking statin regularly now    LDL 71 on 5/22    Reports ED, not on medication    C/o tingling in outer thigh    On neurontin    It helped    Past Medical History:   Diagnosis Date    Atypical chest pain Feb 2015    GXT neg     Diabetes mellitus ([facility] [address]     GERD (gastroesophageal reflux disease)     Pleurisy Sep 2013     Past Surgical History:   Procedure Laterality Date    HAND SURGERY      both, 2010      Current Outpatient Medications   Medication Sig Dispense Refill    metFORMIN (GLUCOPHAGE) 1000 MG tablet Take 1 tablet by mouth once daily with breakfast 30 tablet 1    lisinopril (PRINIVIL;ZESTRIL) 5 MG tablet Take 1 tablet by mouth once daily 30 tablet 1    atorvastatin (LIPITOR) 40 MG tablet Take 1 tablet by mouth once daily 90 tablet 0    Dulaglutide (TRULICITY) 3 IS/9.6RF SOPN 3mg SC weekly 4 Adjustable Dose Pre-filled Pen Syringe 3    Insulin Degludec (TRESIBA FLEXTOUCH) 100 UNIT/ML SOPN INJECT 30 UNITS SUBCUTANEOUSLY ONCE DAILY 15 mL 2    sildenafil (VIAGRA) 50 MG tablet TAKE 1 TABLET BY MOUTH ONCE DAILY AS NEEDED FOR ERECTILE DYSFUNCTION 10 tablet 2    blood glucose test strips (ZOHRA CONTOUR TEST) strip Checks before breakfast and 2 hours after dinner 100 each 3    Blood Glucose Monitoring Suppl (CONTOUR MONITOR) JENNIFER As needed 1 each 1    gabapentin (NEURONTIN) 300 MG capsule One capsule twice a day 60 capsule 2    Insulin Pen Needle (B-D UF III MINI PEN NEEDLES) 31G X 5 MM MISC 1 each by Does not apply route daily 100 each 6    Insulin Syringe-Needle U-100 (RELION INSULIN SYRINGE) 31G X 15/64\" 0.5 ML MISC Use as directed 100 each 0    glucose monitoring kit (FREESTYLE) monitoring kit 1 kit by Does not apply route daily 1 kit 0    blood glucose test strips (ASCENSIA AUTODISC VI;ONE TOUCH ULTRA TEST VI) strip 1 each by In Vitro route daily As needed. 100 each 3    Lancets MISC Contour Checks  each 3    ibuprofen (IBU) 600 MG tablet Take 1 tablet by mouth every 6 hours as needed for Pain Take with food 20 tablet 0    glucose monitoring kit (FREESTYLE) monitoring kit Check sugar bid 1 kit 0    omeprazole (PRILOSEC) 40 MG capsule Take one po qam 30 capsule 1     No current facility-administered medications for this visit. ROS: Scanned, reviewed    Vitals:    01/09/23 1555   BP: 130/69   Pulse: 61   Resp: 14   Temp: 98 °F (36.7 °C)       Constitutional: Well-developed, appears stated age, cooperative, in no acute distress  H/E/N/M/T:atraumatic, normocephalic, external ears, nose, lips normal without lesions  Eyes: Lids, lashes, conjunctivae and sclerae normal, No proptosis, no redness  Neck: supple, symmetrical, no swelling  Skin: No obvious rashes or lesions present.   Skin and hair texture normal  Psychiatric: Judgement and Insight:  judgement and insight appear normal  Neuro: Normal without focal findings, speech is normal normal, speech is spontaneous  Chest: No labored breathing, no chest deformity, no stridor  Musculoskeletal: No joint deformity, swelling          Lab Results   Component Value Date    LABA1C 8.1 09/19/2022         Assessment/Plan      1. Type 2 DM     Bee Richards is a 47 y.o. male has Type 2 DM     Uncontrolled, A1c high  Reviewed log, fasting at goal, needs to check after meals    Complicated by retinopathy, albuminuria    -Continue metformin   -Switch tresiba to lantus 30 uits  Add jardiance      Advised follow-up with the ophthalmologist once year. Due  Urine microalbumin/cr ratio high on multiple measures. on lisinopril 5 mg daily. Discussed foot care. BP at goal    2. Hyperlipidemia. On statins. LDL at goal    3. ED: nl  testosterone, discussed importance of glucose control    4. Right thigh tingling: In outer thigh, may have meralgia paraesthetica. Advised to see PCP to rule out other causes. Neurontin has helped    5. ED: Testosterone normal, started viagra    6. Hand tingling: Reports h/o carpel tunnel surgery , advise to see PCP and get evaluated

## 2023-01-30 ENCOUNTER — TELEPHONE (OUTPATIENT)
Dept: FAMILY MEDICINE CLINIC | Age: 53
End: 2023-01-30

## 2023-01-30 NOTE — TELEPHONE ENCOUNTER
Patient called in requesting an appt. He states he has been having stomach pain/vomiting x 3 days. Patient has not been seen by Dr. Julieta Oneill in over 3 years. He is requesting for a message to go back to Dr. Julieta Oneill due to her already seeing his family with hopes he can be worked in soon. Please advise.   Last OV:  6/7/2019

## 2023-01-30 NOTE — TELEPHONE ENCOUNTER
Pt has been informed. Pt prefers to schedule with Dr. Abby Marie because she speaks Singaporean. Pt states that his stomach is not bothering him as much anymore.

## 2023-02-03 ENCOUNTER — TELEPHONE (OUTPATIENT)
Dept: ENDOCRINOLOGY | Age: 53
End: 2023-02-03

## 2023-02-09 ENCOUNTER — OFFICE VISIT (OUTPATIENT)
Dept: FAMILY MEDICINE CLINIC | Age: 53
End: 2023-02-09
Payer: COMMERCIAL

## 2023-02-09 VITALS
WEIGHT: 144.1 LBS | DIASTOLIC BLOOD PRESSURE: 64 MMHG | RESPIRATION RATE: 16 BRPM | HEART RATE: 70 BPM | HEIGHT: 64 IN | TEMPERATURE: 97.2 F | BODY MASS INDEX: 24.6 KG/M2 | OXYGEN SATURATION: 98 % | SYSTOLIC BLOOD PRESSURE: 120 MMHG

## 2023-02-09 DIAGNOSIS — N40.1 BENIGN PROSTATIC HYPERPLASIA (BPH) WITH STRAINING ON URINATION: ICD-10-CM

## 2023-02-09 DIAGNOSIS — E11.65 UNCONTROLLED TYPE 2 DIABETES MELLITUS WITH HYPERGLYCEMIA (HCC): ICD-10-CM

## 2023-02-09 DIAGNOSIS — R39.16 BENIGN PROSTATIC HYPERPLASIA (BPH) WITH STRAINING ON URINATION: ICD-10-CM

## 2023-02-09 DIAGNOSIS — R39.11 URINARY HESITANCY: ICD-10-CM

## 2023-02-09 DIAGNOSIS — R55 SYNCOPE, UNSPECIFIED SYNCOPE TYPE: Primary | ICD-10-CM

## 2023-02-09 DIAGNOSIS — Z12.5 SCREENING FOR PROSTATE CANCER: ICD-10-CM

## 2023-02-09 DIAGNOSIS — R07.2 PRECORDIAL PAIN: ICD-10-CM

## 2023-02-09 LAB
BACTERIA: NORMAL /HPF
BILIRUBIN URINE: NEGATIVE
BLOOD, URINE: NEGATIVE
CLARITY: CLEAR
COLOR: YELLOW
EPITHELIAL CELLS, UA: 0 /HPF (ref 0–5)
GLUCOSE URINE: >=1000 MG/DL
HYALINE CASTS: 1 /LPF (ref 0–8)
KETONES, URINE: NEGATIVE MG/DL
LEUKOCYTE ESTERASE, URINE: NEGATIVE
MICROSCOPIC EXAMINATION: YES
NITRITE, URINE: NEGATIVE
PH UA: 6 (ref 5–8)
PROSTATE SPECIFIC ANTIGEN: 2.17 NG/ML (ref 0–4)
PROTEIN UA: 30 MG/DL
RBC UA: 1 /HPF (ref 0–4)
SPECIFIC GRAVITY UA: 1.03 (ref 1–1.03)
URINE TYPE: ABNORMAL
UROBILINOGEN, URINE: 0.2 E.U./DL
WBC UA: 1 /HPF (ref 0–5)

## 2023-02-09 PROCEDURE — 81003 URINALYSIS AUTO W/O SCOPE: CPT | Performed by: FAMILY MEDICINE

## 2023-02-09 PROCEDURE — 99215 OFFICE O/P EST HI 40 MIN: CPT | Performed by: FAMILY MEDICINE

## 2023-02-09 PROCEDURE — 36415 COLL VENOUS BLD VENIPUNCTURE: CPT | Performed by: FAMILY MEDICINE

## 2023-02-09 PROCEDURE — 3052F HG A1C>EQUAL 8.0%<EQUAL 9.0%: CPT | Performed by: FAMILY MEDICINE

## 2023-02-09 PROCEDURE — 93000 ELECTROCARDIOGRAM COMPLETE: CPT | Performed by: FAMILY MEDICINE

## 2023-02-09 RX ORDER — FLASH GLUCOSE SCANNING READER
EACH MISCELLANEOUS
Qty: 1 EACH | Refills: 0 | Status: SHIPPED | OUTPATIENT
Start: 2023-02-09

## 2023-02-09 RX ORDER — TAMSULOSIN HYDROCHLORIDE 0.4 MG/1
0.4 CAPSULE ORAL DAILY
Qty: 30 CAPSULE | Refills: 0 | Status: SHIPPED | OUTPATIENT
Start: 2023-02-09

## 2023-02-09 RX ORDER — FLASH GLUCOSE SENSOR
KIT MISCELLANEOUS
Qty: 4 EACH | Refills: 2 | Status: SHIPPED | OUTPATIENT
Start: 2023-02-09

## 2023-02-09 ASSESSMENT — ENCOUNTER SYMPTOMS
BOWEL INCONTINENCE: 0
CHANGE IN BOWEL HABIT: 0
BACK PAIN: 1
ABDOMINAL PAIN: 0
VISUAL CHANGE: 0
NAUSEA: 1
VOMITING: 1

## 2023-02-09 ASSESSMENT — PATIENT HEALTH QUESTIONNAIRE - PHQ9
SUM OF ALL RESPONSES TO PHQ QUESTIONS 1-9: 0
1. LITTLE INTEREST OR PLEASURE IN DOING THINGS: 0
SUM OF ALL RESPONSES TO PHQ QUESTIONS 1-9: 0
SUM OF ALL RESPONSES TO PHQ9 QUESTIONS 1 & 2: 0
SUM OF ALL RESPONSES TO PHQ QUESTIONS 1-9: 0
2. FEELING DOWN, DEPRESSED OR HOPELESS: 0
SUM OF ALL RESPONSES TO PHQ QUESTIONS 1-9: 0

## 2023-02-09 NOTE — PROGRESS NOTES
Subjective:      Patient ID: Roxanna Ivey is a 47 y.o. male. Loss of Consciousness  This is a new problem. The current episode started 1 to 4 weeks ago. The problem has been resolved. Exacerbated by: got up of bed and had sudden feeling of lightheadedness and loc. Associated symptoms include back pain, chest pain, clumsiness, dizziness, light-headedness, malaise/fatigue, nausea, vomiting and weakness. Pertinent negatives include no abdominal pain, auditory change, aura, bladder incontinence, bowel incontinence, confusion, diaphoresis, fever, focal sensory loss, focal weakness, headaches, palpitations, slurred speech, vertigo or visual change. He has tried nothing for the symptoms. DM   Nausea & Vomiting  This is a new problem. The current episode started 1 to 4 weeks ago. The problem occurs intermittently. The problem has been waxing and waning. Associated symptoms include chest pain, nausea, vomiting and weakness. Pertinent negatives include no abdominal pain, anorexia, arthralgias, change in bowel habit, chills, congestion, diaphoresis, fever, headaches, vertigo or visual change. Exacerbated by: thinks [place] may be causing his sx. He has tried nothing for the symptoms. Urinary Frequency   This is a chronic problem. The current episode started more than 1 month ago. The problem occurs every urination. The problem has been unchanged. The pain is at a severity of 0/10. The patient is experiencing no pain. Associated symptoms include frequency, hesitancy, nausea and vomiting. Pertinent negatives include no chills, discharge, flank pain, hematuria, sweats or urgency. He has tried nothing for the symptoms. DM     DM   Compliant with med, little bit better with diet   Has had readings in the 80's,   His most recent a1c was reported as \"high\" (8.1)     Review of Systems   Constitutional:  Positive for malaise/fatigue. Negative for chills, diaphoresis and fever. HENT:  Negative for congestion. Cardiovascular:  Positive for chest pain and syncope. Negative for palpitations. Gastrointestinal:  Positive for nausea and vomiting. Negative for abdominal pain, anorexia, bowel incontinence and change in bowel habit. Genitourinary:  Positive for frequency and hesitancy. Negative for bladder incontinence, flank pain, hematuria and urgency. Musculoskeletal:  Positive for back pain. Negative for arthralgias. Neurological:  Positive for dizziness, weakness and light-headedness. Negative for vertigo, focal weakness and headaches. Psychiatric/Behavioral:  Negative for confusion. Objective:  Blood pressure 120/64, pulse 70, temperature 97.2 °F (36.2 °C), temperature source Tympanic, resp. rate 16, height 5' 4\" (1.626 m), weight 144 lb 1.6 oz (65.4 kg), SpO2 98 %. Physical Exam  Vitals and nursing note reviewed. Constitutional:       General: He is not in acute distress. Appearance: Normal appearance. He is well-developed and normal weight. He is not ill-appearing, toxic-appearing or diaphoretic. HENT:      Head: Normocephalic. Eyes:      General: No scleral icterus. Extraocular Movements: Extraocular movements intact. Conjunctiva/sclera: Conjunctivae normal.      Pupils: Pupils are equal, round, and reactive to light. Neck:      Vascular: No carotid bruit. Comments: No carotid bruits    Cardiovascular:      Rate and Rhythm: Normal rate and regular rhythm. Pulses: Normal pulses. Heart sounds: Normal heart sounds. No murmur heard. No friction rub. Comments: No edema    Pulmonary:      Effort: Pulmonary effort is normal. No respiratory distress. Breath sounds: Normal breath sounds. No stridor. No wheezing, rhonchi or rales. Chest:      Chest wall: No tenderness. Genitourinary:     Rectum: Normal. Guaiac result negative.       Comments: Prostate mildly enlarged, no nodules, no painful     Musculoskeletal:      Cervical back: Normal range of motion and neck supple. Right lower leg: No edema. Left lower leg: No edema. Lymphadenopathy:      Cervical: No cervical adenopathy. Skin:     General: Skin is warm. Capillary Refill: Capillary refill takes less than 2 seconds. Coloration: Skin is not pale. Findings: No erythema or rash. Neurological:      General: No focal deficit present. Mental Status: He is alert and oriented to person, place, and time. Mental status is at baseline. Cranial Nerves: No cranial nerve deficit. Sensory: No sensory deficit. Motor: No weakness. Coordination: Coordination normal.      Gait: Gait normal.      Deep Tendon Reflexes: Reflexes normal.   Psychiatric:         Mood and Affect: Mood normal.         Behavior: Behavior normal.         Thought Content: Thought content normal.       Assessment and plan:        Diagnosis Orders   1. Syncope, unspecified syncope type   His symptoms are prob sec to vasovagal episode,   Get ekg and GXT as recommended since 2018. Patient advised to go to Ed or call 911 if sx worsen,  agrees and verbalizes understanding   EKG 12 lead      2. Precordial pain   With exertion  Get GXT   Go to ED if sx worsen   Asa 81   Continue statin    Stress test, myoview      3. Uncontrolled type 2 diabetes mellitus with hyperglycemia ([facility] [address]   Rx for CGM   He will continue to fu with endocrinology    Continuous Blood Gluc Sensor (FREESTYLE RENY 2 SENSOR) [facility] [place]    Continuous Blood Gluc  (FREESTYLE RENY 2 READER) JENNIFER      4. Urinary hesitancy prostate exam mildly enlarged, no painful, no nodules  Check psa , ua   Trial with flomax    Urinalysis    tamsulosin (FLOMAX) 0.4 MG capsule      5. Screening for prostate cancer  PSA Screening      6.  Benign prostatic hyperplasia (BPH) with straining on urination   Flomax    tamsulosin (FLOMAX) 0.4 MG capsule          Fu 1 mo                         Bharathi Espinosa MD

## 2023-02-17 RX ORDER — SILDENAFIL 50 MG/1
TABLET, FILM COATED ORAL
Qty: 10 TABLET | Refills: 0 | Status: SHIPPED | OUTPATIENT
Start: 2023-02-17

## 2023-02-17 NOTE — TELEPHONE ENCOUNTER
Medication:   Requested Prescriptions     Pending Prescriptions Disp Refills    sildenafil (VIAGRA) 50 MG tablet [Pharmacy Med Name: Sildenafil Citrate 50 MG Oral Tablet] 10 tablet 0     Sig: TAKE 1 TABLET BY MOUTH ONCE DAILY AS NEEDED FOR ERECTILE DYSFUNCTION        Last Filled:      Patient Phone Number: [phone] (home) [phone] (work)    Last appt: 1/9/2023   Next appt: 4/18/2023    Last OARRS: No flowsheet data found.

## 2023-02-21 ENCOUNTER — TELEPHONE (OUTPATIENT)
Dept: FAMILY MEDICINE CLINIC | Age: 53
End: 2023-02-21

## 2023-02-24 ENCOUNTER — TELEPHONE (OUTPATIENT)
Dept: FAMILY MEDICINE CLINIC | Age: 53
End: 2023-02-24

## 2023-02-24 DIAGNOSIS — R07.2 PRECORDIAL PAIN: Primary | ICD-10-CM

## 2023-03-01 LAB
LV EF: 50 %
LVEF MODALITY: NORMAL

## 2023-03-03 DIAGNOSIS — R07.9 CHEST PAIN, UNSPECIFIED TYPE: Primary | ICD-10-CM

## 2023-03-06 ENCOUNTER — TELEPHONE (OUTPATIENT)
Dept: CARDIOLOGY CLINIC | Age: 53
End: 2023-03-06

## 2023-03-06 NOTE — TELEPHONE ENCOUNTER
Patient was referred by Bryson Ventura to the [facility] location with Dr. Taylor Raza. Patient has been scheduled for 3/8/23 at 2:30pm per Annel/RACHANA. Patient verbalized understanding of appointment instructions. Call complete.

## 2023-03-07 PROBLEM — I10 HYPERTENSION: Status: ACTIVE | Noted: 2023-03-07

## 2023-03-07 PROBLEM — E78.5 HYPERLIPIDEMIA: Status: ACTIVE | Noted: 2023-03-07

## 2023-03-07 NOTE — PATIENT INSTRUCTIONS
Schedule an echocardiogram to look at your heart vessels and the strength. Wear a heart monitor for 30 days so we can see what your heart is doing when you feel dizzy/lightheaded.    Come to the ER if you pass out again  Follow up in the office in 6 weeks

## 2023-03-07 NOTE — PROGRESS NOTES
Niyah Ferreira [address]    3/8/2023    Alejandro Chaudhari (:  1970) is a 47 y.o. male presenting as a new patient with complaints of chest pain and syncope. He was seen by pcp who ordered a stress test and is here to follow up. Referring Provider: Elizabeth Fan MD    HISTORY: Mr. Arcelia Lott is here with a medical history of DM, Htn, Hld. Recent stress test came back with normal perfusion. He had been to see his pcp with reports of syncope and LOC on two occasions. He reported chest pain during the episode when he passed out. Today he is here to follow up on chest pain and syncope with [employer]  #[id number]. He reports that when he feels dizzy and checks his blood sugar it is either really high or really low at times. . 6 weeks ago he had two episodes where he 'fainted.' One of the evenings he reports he had eaten a lot of salt, that night he got up to go to the restroom and passed out, witnessed by his wife, he did not lose control of b/b, lasted a couple of minutes. He also reports he had emesis and dizziness. The second episode was similar. Blood sugar was normal with the episodes. He exercises regularly, reports chest pain that is not necessarily with exertion. REVIEW OF SYSTEMS:  A complete review of systems was reviewed and is negative except as noted in the history of present illness. Prior to Visit Medications    Medication Sig Taking?  Authorizing Provider   sildenafil (VIAGRA) 50 MG tablet TAKE 1 TABLET BY MOUTH ONCE DAILY AS NEEDED FOR ERECTILE DYSFUNCTION Yes Sharlene Rodriguez MD   Continuous Blood Gluc Sensor (FREESTYLE RENY 2 SENSOR) [facility] blood sugar qid and prn if hypoglycemic symptoms Yes Elizabeth Fan MD   Continuous Blood Gluc  (FREESTYLE RENY 2 READER) JENNIFER Check bs qid and prn Yes Elizabeth Fan MD   tamsulosin (FLOMAX) 0.4 MG capsule Take 1 capsule by mouth daily Yes Elizabeth Fan MD   atorvastatin (LIPITOR) 40 MG tablet Take 1 tablet by mouth once daily Yes Marsha Norwood MD   lisinopril (PRINIVIL;ZESTRIL) 5 MG tablet Take 1 tablet by mouth once daily Yes Marsha Norwood MD   metFORMIN (GLUCOPHAGE) 1000 MG tablet Take 1 tablet by mouth once daily with breakfast Yes Marsha Norwood MD   gabapentin (NEURONTIN) 300 MG capsule One capsule twice a day Yes Marsha Norwood MD   empagliflozin (JARDIANCE) 10 MG tablet Take 1 tablet by mouth daily Yes Marsha Norwood MD   Insulin Degludec (TRESIBA FLEXTOUCH) 100 UNIT/ML SOPN INJECT 30 UNITS SUBCUTANEOUSLY ONCE DAILY Yes Marsha Norwood MD   Blood Glucose Monitoring Suppl (ONE TOUCH ULTRA 2) w/Device KIT 1 kit by Does not apply route daily  Patient not taking: No sig reported  Marsha Norwood MD   blood glucose test strips (EXACTECH TEST) strip 1-2 times daily  Patient not taking: No sig reported  Marsha Norwood MD        No Known Allergies    Past Medical History:   Diagnosis Date    Atypical chest pain Feb 2015    GXT neg     Diabetes mellitus ([address]     GERD (gastroesophageal reflux disease)     Pleurisy Sep 2013       Past Surgical History:   Procedure Laterality Date    HAND SURGERY      both, 2010        Social History     Tobacco Use    Smoking status: Never    Smokeless tobacco: Never   Substance Use Topics    Alcohol use: Never        Family History   Problem Relation Age of Onset    Hypertension Mother        PHYSICAL EXAMINATION:  Vitals:    03/08/23 1503   BP: 130/70   Site: Left Upper Arm   Position: Sitting   Cuff Size: Medium Adult   Pulse: 70   SpO2: 96%   Weight: 151 lb (68.5 kg)   Height: 5' 6\" (1.676 m)     Estimated body mass index is 24.37 kg/m² as calculated from the following:    Height as of this encounter: 5' 6\" (1.676 m). Weight as of this encounter: 151 lb (68.5 kg). Physical Exam  HENT:      Head: Normocephalic and atraumatic. Eyes:      Extraocular Movements: Extraocular movements intact.       Conjunctiva/sclera: Conjunctivae normal. Cardiovascular:      Rate and Rhythm: Normal rate and regular rhythm. Pulmonary:      Effort: Pulmonary effort is normal.      Breath sounds: Normal breath sounds. Abdominal:      General: Bowel sounds are normal.      Palpations: Abdomen is soft. Musculoskeletal:         General: Normal range of motion. Cervical back: Normal range of motion and neck supple. Skin:     General: Skin is warm and dry. Neurological:      General: No focal deficit present. Mental Status: He is alert and oriented to person, place, and time. Psychiatric:         Mood and Affect: Mood normal.         Behavior: Behavior normal.         Thought Content: Thought content normal.         Judgment: Judgment normal.         LABS:  CBC:   Lab Results   Component Value Date/Time    WBC 9.3 09/02/2013 08:10 PM    RBC 4.00 09/02/2013 08:10 PM    HGB 11.3 09/02/2013 08:10 PM    HCT 34.8 09/02/2013 08:10 PM    MCV 87.0 09/02/2013 08:10 PM    RDW 12.2 09/02/2013 08:10 PM     09/02/2013 08:10 PM     CMP:   Lab Results   Component Value Date/Time     05/10/2022 12:26 PM    K 4.5 05/10/2022 12:26 PM     05/10/2022 12:26 PM    CO2 22 05/10/2022 12:26 PM    BUN 28 05/10/2022 12:26 PM    CREATININE 1.1 05/10/2022 12:26 PM    GFRAA >60 05/10/2022 12:26 PM    AGRATIO 1.6 05/10/2022 12:26 PM    LABGLOM >60 05/10/2022 12:26 PM    LABGLOM 106 10/29/2016 11:36 AM    GLUCOSE 87 05/10/2022 12:26 PM    PROT 7.3 05/10/2022 12:26 PM    CALCIUM 9.7 05/10/2022 12:26 PM    BILITOT 0.6 05/10/2022 12:26 PM    ALKPHOS 97 05/10/2022 12:26 PM    AST 19 05/10/2022 12:26 PM    ALT 25 05/10/2022 12:26 PM     LIPIDS: No components found for: TOTAL CHOLESTEROL,  HDL,  LDL,  TRIGLYCERIDES  PT/INR: No results found for: PTINR    Stress 3/1/23  IMPRESSION:  No pharmaceutical induced perfusion defect  Uniform left ventricular hypocontractility, ejection fraction measured at 50%        ASSESSMENT/PLAN:  1.  Chest pain, unspecified type  - MPI 3/2023 - normal perfusion  - non exertional cp pain on left side    Plan:  echo     2. Hypertension, unspecified type  /70 (Site: Left Upper Arm, Position: Sitting, Cuff Size: Medium Adult)   Pulse 70   Ht 5' 6\" (1.676 m)   Wt 151 lb (68.5 kg)   SpO2 96%   BMI 24.37 kg/m²    Lisinopril    Plan: stable. 3. Hyperlipidemia, unspecified hyperlipidemia type  5/10/22  TG 71 HDL 52 LDL 71. Lipitor 40 mg    Plan: managed by pcp    4. DM  1/2023 Hgb A1C 8.3  Plan: managed by pcp     5. Syncopal episodes   - two episodes of passing out. Dizziness prior. Possible vasovagal in etiology. Plan: 30 day monitor. Echo. Plan:  Schedule an echocardiogram  Wear 30 day event monitor   Follow up in 6 weeks, echo can be same day     Return in about 6 weeks (around 4/19/2023). Scribe's Attestation: This note was scribed in the presence of Dr. Tien Araiza MD by Jennifer Barriga RN. Physician Attestation: The scribe's documentation has been prepared under my direction and personally reviewed by me in its entirety. I confirm that the note above accurately reflects all work, treatment, procedures, and medical decision making performed by me. An  electronic signature was used to authenticate this note. Gio Peterson MD, [facility] [place], [address]

## 2023-03-08 ENCOUNTER — OFFICE VISIT (OUTPATIENT)
Dept: CARDIOLOGY CLINIC | Age: 53
End: 2023-03-08
Payer: COMMERCIAL

## 2023-03-08 VITALS
BODY MASS INDEX: 24.27 KG/M2 | OXYGEN SATURATION: 96 % | SYSTOLIC BLOOD PRESSURE: 130 MMHG | WEIGHT: 151 LBS | HEART RATE: 70 BPM | DIASTOLIC BLOOD PRESSURE: 70 MMHG | HEIGHT: 66 IN

## 2023-03-08 DIAGNOSIS — I10 HYPERTENSION, UNSPECIFIED TYPE: ICD-10-CM

## 2023-03-08 DIAGNOSIS — R07.9 CHEST PAIN, UNSPECIFIED TYPE: Primary | ICD-10-CM

## 2023-03-08 DIAGNOSIS — R40.20 LOC (LOSS OF CONSCIOUSNESS) (HCC): ICD-10-CM

## 2023-03-08 DIAGNOSIS — R55 SYNCOPE, UNSPECIFIED SYNCOPE TYPE: ICD-10-CM

## 2023-03-08 DIAGNOSIS — E78.5 HYPERLIPIDEMIA, UNSPECIFIED HYPERLIPIDEMIA TYPE: ICD-10-CM

## 2023-03-08 PROCEDURE — 93000 ELECTROCARDIOGRAM COMPLETE: CPT | Performed by: INTERNAL MEDICINE

## 2023-03-08 PROCEDURE — 99244 OFF/OP CNSLTJ NEW/EST MOD 40: CPT | Performed by: INTERNAL MEDICINE

## 2023-03-08 PROCEDURE — 3078F DIAST BP <80 MM HG: CPT | Performed by: INTERNAL MEDICINE

## 2023-03-08 PROCEDURE — 3075F SYST BP GE 130 - 139MM HG: CPT | Performed by: INTERNAL MEDICINE

## 2023-03-17 DIAGNOSIS — N40.1 BENIGN PROSTATIC HYPERPLASIA (BPH) WITH STRAINING ON URINATION: ICD-10-CM

## 2023-03-17 DIAGNOSIS — R39.16 BENIGN PROSTATIC HYPERPLASIA (BPH) WITH STRAINING ON URINATION: ICD-10-CM

## 2023-03-17 DIAGNOSIS — R39.11 URINARY HESITANCY: ICD-10-CM

## 2023-03-20 RX ORDER — TAMSULOSIN HYDROCHLORIDE 0.4 MG/1
CAPSULE ORAL
Qty: 30 CAPSULE | Refills: 5 | Status: SHIPPED | OUTPATIENT
Start: 2023-03-20

## 2023-03-20 RX ORDER — SILDENAFIL 50 MG/1
TABLET, FILM COATED ORAL
Qty: 10 TABLET | Refills: 0 | Status: SHIPPED | OUTPATIENT
Start: 2023-03-20

## 2023-03-20 NOTE — TELEPHONE ENCOUNTER
Medication:   Requested Prescriptions     Pending Prescriptions Disp Refills    sildenafil (VIAGRA) 50 MG tablet [Pharmacy Med Name: Sildenafil Citrate 50 MG Oral Tablet] 10 tablet 0     Sig: TAKE 1 TABLET BY MOUTH ONCE DAILY AS NEEDED FOR ERECTILE DYSFUNCTION        Last Filled:      Patient Phone Number: [phone] (home) [phone] (work)    Last appt: 1/9/2023   Next appt: 4/18/2023    Last OARRS: No flowsheet data found.

## 2023-05-01 RX ORDER — SILDENAFIL 50 MG/1
TABLET, FILM COATED ORAL
Qty: 10 TABLET | Refills: 0 | Status: SHIPPED | OUTPATIENT
Start: 2023-05-01

## 2023-05-01 NOTE — TELEPHONE ENCOUNTER
Medication:   Requested Prescriptions     Pending Prescriptions Disp Refills    sildenafil (VIAGRA) 50 MG tablet [Pharmacy Med Name: Sildenafil Citrate 50 MG Oral Tablet] 10 tablet 0     Sig: TAKE 1 TABLET BY MOUTH ONCE DAILY AS NEEDED FOR ERECTILE DYSFUNCTION       Last Filled:      Patient Phone Number: [phone] (home) [phone] (work)    Last appt: 1/9/2023   Next appt: Visit date not found    Last PSA:   Lab Results   Component Value Date/Time    PSA 2.17 02/09/2023 10:21 AM

## 2023-06-19 ENCOUNTER — TELEPHONE (OUTPATIENT)
Dept: FAMILY MEDICINE CLINIC | Age: 53
End: 2023-06-19

## 2023-06-19 DIAGNOSIS — N40.0 ENLARGED PROSTATE: Primary | ICD-10-CM

## 2023-06-19 NOTE — TELEPHONE ENCOUNTER
Patient called stating that he is still having severe pain on the right side. Patient is wondering what to do for the pain. Patient also asked about recent results from recent imaging.        Last Office Visit: 06/16/2023

## 2023-09-25 RX ORDER — SILDENAFIL 50 MG/1
TABLET, FILM COATED ORAL
Qty: 10 TABLET | Refills: 2 | OUTPATIENT
Start: 2023-09-25

## 2023-10-30 DIAGNOSIS — N40.1 BENIGN PROSTATIC HYPERPLASIA (BPH) WITH STRAINING ON URINATION: ICD-10-CM

## 2023-10-30 DIAGNOSIS — R39.11 URINARY HESITANCY: ICD-10-CM

## 2023-10-30 DIAGNOSIS — R39.16 BENIGN PROSTATIC HYPERPLASIA (BPH) WITH STRAINING ON URINATION: ICD-10-CM

## 2023-10-30 DIAGNOSIS — E11.9 TYPE 2 DIABETES MELLITUS WITHOUT COMPLICATION, WITH LONG-TERM CURRENT USE OF INSULIN (HCC): ICD-10-CM

## 2023-10-30 DIAGNOSIS — Z79.4 TYPE 2 DIABETES MELLITUS WITHOUT COMPLICATION, WITH LONG-TERM CURRENT USE OF INSULIN (HCC): ICD-10-CM

## 2023-10-31 RX ORDER — TAMSULOSIN HYDROCHLORIDE 0.4 MG/1
CAPSULE ORAL
Qty: 30 CAPSULE | Refills: 3 | Status: SHIPPED | OUTPATIENT
Start: 2023-10-31

## 2023-10-31 RX ORDER — LISINOPRIL 5 MG/1
TABLET ORAL
Qty: 30 TABLET | Refills: 3 | Status: SHIPPED | OUTPATIENT
Start: 2023-10-31

## 2023-10-31 NOTE — TELEPHONE ENCOUNTER
Medication:   Requested Prescriptions     Pending Prescriptions Disp Refills    lisinopril (PRINIVIL;ZESTRIL) 5 MG tablet [Pharmacy Med Name: Lisinopril 5 MG Oral Tablet] 30 tablet 3     Sig: Take 1 tablet by mouth once daily       Last Filled:      Patient Phone Number: [phone] (home) [phone] (work)    Last appt: 1/9/2023   Next appt: Visit date not found    Last Lipid:   Lab Results   Component Value Date/Time    CHOL 137 05/10/2022 12:26 PM    TRIG 71 05/10/2022 12:26 PM    HDL 52 05/10/2022 12:26 PM    [facility] 71 05/10/2022 12:26 PM

## 2023-10-31 NOTE — TELEPHONE ENCOUNTER
Medication:   Requested Prescriptions     Pending Prescriptions Disp Refills    tamsulosin (FLOMAX) 0.4 MG capsule [Pharmacy Med Name: Tamsulosin HCl 0.4 MG Oral Capsule] 30 capsule 0     Sig: Take 1 capsule by mouth once daily        Last Filled:      Patient Phone Number: [phone] (home) [phone] (work)    Last appt: 6/16/2023   Next appt: Visit date not found    Last OARRS:        No data to display

## 2024-01-08 ENCOUNTER — COMMUNITY OUTREACH (OUTPATIENT)
Dept: FAMILY MEDICINE CLINIC | Age: 54
End: 2024-01-08

## 2024-02-24 DIAGNOSIS — Z79.4 TYPE 2 DIABETES MELLITUS WITHOUT COMPLICATION, WITH LONG-TERM CURRENT USE OF INSULIN (HCC): ICD-10-CM

## 2024-02-24 DIAGNOSIS — R39.11 URINARY HESITANCY: ICD-10-CM

## 2024-02-24 DIAGNOSIS — N40.1 BENIGN PROSTATIC HYPERPLASIA (BPH) WITH STRAINING ON URINATION: ICD-10-CM

## 2024-02-24 DIAGNOSIS — R39.16 BENIGN PROSTATIC HYPERPLASIA (BPH) WITH STRAINING ON URINATION: ICD-10-CM

## 2024-02-24 DIAGNOSIS — E11.9 TYPE 2 DIABETES MELLITUS WITHOUT COMPLICATION, WITH LONG-TERM CURRENT USE OF INSULIN (HCC): ICD-10-CM

## 2024-02-26 RX ORDER — LISINOPRIL 5 MG/1
TABLET ORAL
Qty: 30 TABLET | Refills: 3 | Status: SHIPPED | OUTPATIENT
Start: 2024-02-26

## 2024-02-26 NOTE — TELEPHONE ENCOUNTER
Medication:   Requested Prescriptions     Pending Prescriptions Disp Refills    lisinopril (PRINIVIL;ZESTRIL) 5 MG tablet [Pharmacy Med Name: Lisinopril 5 MG Oral Tablet] 30 tablet 3     Sig: Take 1 tablet by mouth once daily       Last Filled:      Patient Phone Number: [phone] (home) [phone] (work)    Last appt: 1/9/2023   Next appt: Visit date not found    Last Lipid:   Lab Results   Component Value Date/Time    CHOL 137 05/10/2022 12:26 PM    TRIG 71 05/10/2022 12:26 PM    HDL 52 05/10/2022 12:26 PM    LDLCALC 71 05/10/2022 12:26 PM                Patient last saw Omer Rouse in the office on 11/18/23.  Patient has an upcoming appointment on 5/23/23.  Patients last refill was 9/1/22 for METFORMIN, 11/23/22 for SPIRONOLACTONE.    Please advice.

## 2024-02-27 RX ORDER — TAMSULOSIN HYDROCHLORIDE 0.4 MG/1
CAPSULE ORAL
Qty: 30 CAPSULE | Refills: 5 | Status: SHIPPED | OUTPATIENT
Start: 2024-02-27

## 2024-02-27 NOTE — TELEPHONE ENCOUNTER
Medication:   Requested Prescriptions     Pending Prescriptions Disp Refills    tamsulosin (FLOMAX) 0.4 MG capsule [Pharmacy Med Name: Tamsulosin HCl 0.4 MG Oral Capsule] 30 capsule 0     Sig: Take 1 capsule by mouth once daily        Last Filled:  10/31/2023     Patient Phone Number: [phone] (home) [phone] (work)    Last appt: 6/16/2023   Next appt: Visit date not found    Last OARRS:        No data to display

## 2024-03-15 ENCOUNTER — OFFICE VISIT (OUTPATIENT)
Dept: FAMILY MEDICINE CLINIC | Age: 54
End: 2024-03-15
Payer: COMMERCIAL

## 2024-03-15 VITALS
WEIGHT: 151.6 LBS | SYSTOLIC BLOOD PRESSURE: 110 MMHG | DIASTOLIC BLOOD PRESSURE: 64 MMHG | HEIGHT: 66 IN | OXYGEN SATURATION: 92 % | TEMPERATURE: 97.6 F | BODY MASS INDEX: 24.36 KG/M2 | HEART RATE: 69 BPM

## 2024-03-15 DIAGNOSIS — N40.1 BENIGN PROSTATIC HYPERPLASIA (BPH) WITH STRAINING ON URINATION: ICD-10-CM

## 2024-03-15 DIAGNOSIS — R39.11 URINARY HESITANCY: ICD-10-CM

## 2024-03-15 DIAGNOSIS — R39.16 BENIGN PROSTATIC HYPERPLASIA (BPH) WITH STRAINING ON URINATION: ICD-10-CM

## 2024-03-15 DIAGNOSIS — E78.5 HYPERLIPIDEMIA, UNSPECIFIED HYPERLIPIDEMIA TYPE: ICD-10-CM

## 2024-03-15 DIAGNOSIS — E11.65 UNCONTROLLED TYPE 2 DIABETES MELLITUS WITH HYPERGLYCEMIA (HCC): Primary | ICD-10-CM

## 2024-03-15 DIAGNOSIS — B35.1 ONYCHOMYCOSIS: ICD-10-CM

## 2024-03-15 DIAGNOSIS — E11.49 OTHER DIABETIC NEUROLOGICAL COMPLICATION ASSOCIATED WITH TYPE 2 DIABETES MELLITUS (HCC): ICD-10-CM

## 2024-03-15 DIAGNOSIS — Z12.5 SCREENING FOR PROSTATE CANCER: ICD-10-CM

## 2024-03-15 LAB
ALBUMIN SERPL-MCNC: 4.3 G/DL (ref 3.4–5)
ALBUMIN/GLOB SERPL: 1.7 {RATIO} (ref 1.1–2.2)
ALP SERPL-CCNC: 126 U/L (ref 40–129)
ALT SERPL-CCNC: 20 U/L (ref 10–40)
ANION GAP SERPL CALCULATED.3IONS-SCNC: 12 MMOL/L (ref 3–16)
AST SERPL-CCNC: 12 U/L (ref 15–37)
BASOPHILS # BLD: 0.1 K/UL (ref 0–0.2)
BASOPHILS NFR BLD: 0.7 %
BILIRUB SERPL-MCNC: 0.4 MG/DL (ref 0–1)
BUN SERPL-MCNC: 19 MG/DL (ref 7–20)
CALCIUM SERPL-MCNC: 9.7 MG/DL (ref 8.3–10.6)
CHLORIDE SERPL-SCNC: 99 MMOL/L (ref 99–110)
CHOLEST SERPL-MCNC: 221 MG/DL (ref 0–199)
CO2 SERPL-SCNC: 26 MMOL/L (ref 21–32)
CREAT SERPL-MCNC: 0.8 MG/DL (ref 0.9–1.3)
CREAT UR-MCNC: 72.9 MG/DL (ref 39–259)
DEPRECATED RDW RBC AUTO: 13.1 % (ref 12.4–15.4)
EOSINOPHIL # BLD: 0.3 K/UL (ref 0–0.6)
EOSINOPHIL NFR BLD: 3.7 %
GFR SERPLBLD CREATININE-BSD FMLA CKD-EPI: >60 ML/MIN/{1.73_M2}
GLUCOSE SERPL-MCNC: 181 MG/DL (ref 70–99)
HBA1C MFR BLD: 11.3 %
HCT VFR BLD AUTO: 41.8 % (ref 40.5–52.5)
HDLC SERPL-MCNC: 39 MG/DL (ref 40–60)
HGB BLD-MCNC: 13.8 G/DL (ref 13.5–17.5)
LDLC SERPL CALC-MCNC: 133 MG/DL
LYMPHOCYTES # BLD: 2.3 K/UL (ref 1–5.1)
LYMPHOCYTES NFR BLD: 30.6 %
MCH RBC QN AUTO: 28.9 PG (ref 26–34)
MCHC RBC AUTO-ENTMCNC: 33 G/DL (ref 31–36)
MCV RBC AUTO: 87.7 FL (ref 80–100)
MICROALBUMIN UR DL<=1MG/L-MCNC: 14.1 MG/DL
MICROALBUMIN/CREAT UR: 193.4 MG/G (ref 0–30)
MONOCYTES # BLD: 0.5 K/UL (ref 0–1.3)
MONOCYTES NFR BLD: 6.1 %
NEUTROPHILS # BLD: 4.4 K/UL (ref 1.7–7.7)
NEUTROPHILS NFR BLD: 58.9 %
PLATELET # BLD AUTO: 273 K/UL (ref 135–450)
PMV BLD AUTO: 9.1 FL (ref 5–10.5)
POTASSIUM SERPL-SCNC: 4.5 MMOL/L (ref 3.5–5.1)
PROT SERPL-MCNC: 6.8 G/DL (ref 6.4–8.2)
RBC # BLD AUTO: 4.76 M/UL (ref 4.2–5.9)
SODIUM SERPL-SCNC: 137 MMOL/L (ref 136–145)
TRIGL SERPL-MCNC: 247 MG/DL (ref 0–150)
VLDLC SERPL CALC-MCNC: 49 MG/DL
WBC # BLD AUTO: 7.5 K/UL (ref 4–11)

## 2024-03-15 PROCEDURE — 3078F DIAST BP <80 MM HG: CPT | Performed by: FAMILY MEDICINE

## 2024-03-15 PROCEDURE — 83036 HEMOGLOBIN GLYCOSYLATED A1C: CPT | Performed by: FAMILY MEDICINE

## 2024-03-15 PROCEDURE — 3046F HEMOGLOBIN A1C LEVEL >9.0%: CPT | Performed by: FAMILY MEDICINE

## 2024-03-15 PROCEDURE — 99214 OFFICE O/P EST MOD 30 MIN: CPT | Performed by: FAMILY MEDICINE

## 2024-03-15 PROCEDURE — 3074F SYST BP LT 130 MM HG: CPT | Performed by: FAMILY MEDICINE

## 2024-03-15 RX ORDER — INSULIN DEGLUDEC 100 U/ML
INJECTION, SOLUTION SUBCUTANEOUS
Qty: 15 ML | Refills: 2 | Status: SHIPPED | OUTPATIENT
Start: 2024-03-15

## 2024-03-15 RX ORDER — GABAPENTIN 300 MG/1
CAPSULE ORAL
Qty: 60 CAPSULE | Refills: 3 | Status: CANCELLED | OUTPATIENT
Start: 2024-03-15 | End: 2025-03-14

## 2024-03-15 RX ORDER — LISINOPRIL 5 MG/1
5 TABLET ORAL DAILY
Qty: 90 TABLET | Refills: 1 | Status: SHIPPED | OUTPATIENT
Start: 2024-03-15

## 2024-03-15 RX ORDER — ATORVASTATIN CALCIUM 40 MG/1
TABLET, FILM COATED ORAL
Qty: 90 TABLET | Refills: 1 | Status: SHIPPED | OUTPATIENT
Start: 2024-03-15

## 2024-03-15 RX ORDER — TAMSULOSIN HYDROCHLORIDE 0.4 MG/1
0.4 CAPSULE ORAL DAILY
Qty: 30 CAPSULE | Refills: 5 | Status: SHIPPED | OUTPATIENT
Start: 2024-03-15

## 2024-03-15 RX ORDER — TERBINAFINE HYDROCHLORIDE 250 MG/1
250 TABLET ORAL DAILY
Qty: 30 TABLET | Refills: 2 | Status: SHIPPED | OUTPATIENT
Start: 2024-03-15 | End: 2024-06-13

## 2024-03-15 RX ORDER — INSULIN ASPART 100 [IU]/ML
INJECTION, SUSPENSION SUBCUTANEOUS
Qty: 10 ML | Refills: 3 | Status: SHIPPED | OUTPATIENT
Start: 2024-03-15

## 2024-03-15 RX ORDER — SYRINGE-NEEDLE,INSULIN,0.5 ML 31 GX5/16"
1 SYRINGE, EMPTY DISPOSABLE MISCELLANEOUS DAILY
Qty: 100 EACH | Refills: 3 | Status: SHIPPED | OUTPATIENT
Start: 2024-03-15

## 2024-03-15 ASSESSMENT — PATIENT HEALTH QUESTIONNAIRE - PHQ9
SUM OF ALL RESPONSES TO PHQ9 QUESTIONS 1 & 2: 0
SUM OF ALL RESPONSES TO PHQ QUESTIONS 1-9: 0
SUM OF ALL RESPONSES TO PHQ QUESTIONS 1-9: 0
2. FEELING DOWN, DEPRESSED OR HOPELESS: NOT AT ALL
1. LITTLE INTEREST OR PLEASURE IN DOING THINGS: NOT AT ALL
SUM OF ALL RESPONSES TO PHQ QUESTIONS 1-9: 0
SUM OF ALL RESPONSES TO PHQ QUESTIONS 1-9: 0

## 2024-03-15 NOTE — PROGRESS NOTES
Rate and Rhythm: Normal rate and regular rhythm.      Pulses: Normal pulses.           Dorsalis pedis pulses are 2+ on the right side and 2+ on the left side.        Posterior tibial pulses are 2+ on the right side and 2+ on the left side.      Heart sounds: Normal heart sounds. No murmur heard.     No friction rub.      Comments: No edema    Pulmonary:      Effort: Pulmonary effort is normal. No respiratory distress.      Breath sounds: Normal breath sounds. No stridor. No wheezing, rhonchi or rales.   Chest:      Chest wall: No tenderness.   Musculoskeletal:      Cervical back: Normal range of motion and neck supple.      Right lower leg: No edema.      Left lower leg: No edema.      Right foot: Normal range of motion.      Left foot: Normal range of motion.   Feet:      Right foot:      Protective Sensation: 6 sites tested.  4 sites sensed.      Skin integrity: Dry skin present.      Toenail Condition: Right toenails are abnormally thick.      Left foot:      Protective Sensation: 6 sites tested.  4 sites sensed.      Skin integrity: Dry skin present.      Toenail Condition: Left toenails are abnormally thick.   Lymphadenopathy:      Cervical: No cervical adenopathy.   Skin:     General: Skin is warm.      Capillary Refill: Capillary refill takes less than 2 seconds.      Coloration: Skin is not pale.      Findings: No erythema or rash.   Neurological:      General: No focal deficit present.      Mental Status: He is alert and oriented to person, place, and time. Mental status is at baseline.      Cranial Nerves: No cranial nerve deficit.      Coordination: Coordination normal.   Psychiatric:         Mood and Affect: Mood normal.         Behavior: Behavior normal.         Thought Content: Thought content normal.           A1C 11.3   Assessment and plan :        Diagnosis Orders   1. Uncontrolled type 2 diabetes mellitus with hyperglycemia (HCC)   Poor compliance with diet  Add Novolog 70/30 bid   Continue

## 2024-03-16 LAB
EST. AVERAGE GLUCOSE BLD GHB EST-MCNC: 283.4 MG/DL
HBA1C MFR BLD: 11.5 %

## 2024-06-20 ENCOUNTER — COMMUNITY OUTREACH (OUTPATIENT)
Dept: FAMILY MEDICINE CLINIC | Age: 54
End: 2024-06-20

## 2024-06-20 NOTE — PROGRESS NOTES
Patient's HM shows they are overdue for Hemoglobin A1C  Care Everywhere and  files searched.  No results to attach to order nor HM updated.

## 2024-11-09 DIAGNOSIS — E11.65 UNCONTROLLED TYPE 2 DIABETES MELLITUS WITH HYPERGLYCEMIA (HCC): ICD-10-CM

## 2024-11-11 RX ORDER — LISINOPRIL 5 MG/1
5 TABLET ORAL DAILY
Qty: 90 TABLET | Refills: 1 | Status: SHIPPED | OUTPATIENT
Start: 2024-11-11

## 2024-11-11 NOTE — TELEPHONE ENCOUNTER
Medication:   Requested Prescriptions     Pending Prescriptions Disp Refills    lisinopril (PRINIVIL;ZESTRIL) 5 MG tablet [Pharmacy Med Name: Lisinopril 5 MG Oral Tablet] 90 tablet 0     Sig: Take 1 tablet by mouth once daily        Last Filled:  03/15/2024     Patient Phone Number: [phone] (home) [phone] (work)    Last appt: 3/15/2024   Next appt: Visit date not found    Last OARRS:        No data to display

## 2024-11-26 ENCOUNTER — COMMUNITY OUTREACH (OUTPATIENT)
Dept: FAMILY MEDICINE CLINIC | Age: 54
End: 2024-11-26

## 2025-03-02 DIAGNOSIS — R39.11 URINARY HESITANCY: ICD-10-CM

## 2025-03-02 DIAGNOSIS — N40.1 BENIGN PROSTATIC HYPERPLASIA (BPH) WITH STRAINING ON URINATION: ICD-10-CM

## 2025-03-02 DIAGNOSIS — R39.16 BENIGN PROSTATIC HYPERPLASIA (BPH) WITH STRAINING ON URINATION: ICD-10-CM

## 2025-03-03 DIAGNOSIS — N40.1 BENIGN PROSTATIC HYPERPLASIA (BPH) WITH STRAINING ON URINATION: ICD-10-CM

## 2025-03-03 DIAGNOSIS — R39.16 BENIGN PROSTATIC HYPERPLASIA (BPH) WITH STRAINING ON URINATION: ICD-10-CM

## 2025-03-03 DIAGNOSIS — R39.11 URINARY HESITANCY: ICD-10-CM

## 2025-03-03 RX ORDER — TAMSULOSIN HYDROCHLORIDE 0.4 MG/1
0.4 CAPSULE ORAL DAILY
Qty: 30 CAPSULE | Refills: 0 | Status: SHIPPED | OUTPATIENT
Start: 2025-03-03 | End: 2025-03-05

## 2025-03-03 NOTE — TELEPHONE ENCOUNTER
Medication:   Requested Prescriptions     Pending Prescriptions Disp Refills    tamsulosin (FLOMAX) 0.4 MG capsule [Pharmacy Med Name: Tamsulosin HCl 0.4 MG Oral Capsule] 30 capsule 0     Sig: Take 1 capsule by mouth once daily        Last Filled:  03/03/2025     Patient Phone Number: [phone] (home) [phone] (work)    Last appt: 3/15/2024   Next appt: Visit date not found    Last OARRS:        No data to display

## 2025-03-03 NOTE — TELEPHONE ENCOUNTER
Medication:   Requested Prescriptions     Pending Prescriptions Disp Refills    tamsulosin (FLOMAX) 0.4 MG capsule [Pharmacy Med Name: Tamsulosin HCl 0.4 MG Oral Capsule] 30 capsule 0     Sig: Take 1 capsule by mouth once daily        Last Filled:  03/15/2024     Patient Phone Number: [phone] (home) [phone] (work)    Last appt: 3/15/2024   Next appt: Visit date not found    Last OARRS:        No data to display

## 2025-03-05 RX ORDER — TAMSULOSIN HYDROCHLORIDE 0.4 MG/1
0.4 CAPSULE ORAL DAILY
Qty: 30 CAPSULE | Refills: 5 | Status: SHIPPED | OUTPATIENT
Start: 2025-03-05

## 2025-07-22 ENCOUNTER — TELEPHONE (OUTPATIENT)
Dept: FAMILY MEDICINE CLINIC | Age: 55
End: 2025-07-22

## 2025-07-22 NOTE — TELEPHONE ENCOUNTER
----- Message from Roland VALDEZ sent at 7/16/2025 10:20 AM EDT -----  Regarding: [facility] Appointment Request  [facility] Appointment Request    Patient needs appointment for [facility] Appointment Type: Existing Condition Follow Up.    Patient Requested Dates(s): July 21, 2025   Patient Requested Time: between 7 and 12 noon  Provider Name: Estephania Murphy MD     Reason for Appointment Request: Established Patient - Available appointments did not meet patient need, pt has an appt. This coming July 18 at 9 AM. However, due to work conflict pt can't make it and wanted to reschedule on the said day and time above.   --------------------------------------------------------------------------------------------------------------------------    Relationship to Patient: Self     Call Back Information: OK to leave message on voicemail  Preferred Call Back Number: Phone tel:[phone]

## 2025-07-25 NOTE — TELEPHONE ENCOUNTER
Pt returning call to schedule follow up appt.  Pt has been scheduled for appt 7/28/2025 at 7:45 AM.